# Patient Record
Sex: FEMALE | Race: WHITE | Employment: OTHER | URBAN - METROPOLITAN AREA
[De-identification: names, ages, dates, MRNs, and addresses within clinical notes are randomized per-mention and may not be internally consistent; named-entity substitution may affect disease eponyms.]

---

## 2017-04-26 ENCOUNTER — HOSPITAL ENCOUNTER (OUTPATIENT)
Dept: GENERAL RADIOLOGY | Facility: HOSPITAL | Age: 77
Discharge: HOME | End: 2017-04-26
Attending: INTERNAL MEDICINE
Payer: MEDICARE

## 2017-04-26 DIAGNOSIS — M25.572 ACUTE LEFT ANKLE PAIN: ICD-10-CM

## 2017-04-26 PROCEDURE — 73600 X-RAY EXAM OF ANKLE: CPT

## 2017-07-27 ENCOUNTER — HOSPITAL ENCOUNTER (OUTPATIENT)
Dept: MAMMOGRAPHY | Facility: HOSPITAL | Age: 77
Discharge: HOME | End: 2017-07-27
Attending: INTERNAL MEDICINE
Payer: MEDICARE

## 2017-07-27 ENCOUNTER — HOSPITAL ENCOUNTER (OUTPATIENT)
Dept: BONE DENSITY | Facility: HOSPITAL | Age: 77
Discharge: HOME | End: 2017-07-27
Attending: INTERNAL MEDICINE
Payer: MEDICARE

## 2017-07-27 DIAGNOSIS — Z13.820 ENCOUNTER FOR SCREENING FOR OSTEOPOROSIS: ICD-10-CM

## 2017-07-27 DIAGNOSIS — Z12.39 BREAST CANCER SCREENING: ICD-10-CM

## 2017-07-27 PROCEDURE — 77063 BREAST TOMOSYNTHESIS BI: CPT | Performed by: INTERNAL MEDICINE

## 2017-07-27 PROCEDURE — 77067 SCR MAMMO BI INCL CAD: CPT | Performed by: INTERNAL MEDICINE

## 2017-07-27 PROCEDURE — 77080 DXA BONE DENSITY AXIAL: CPT | Performed by: INTERNAL MEDICINE

## 2017-09-25 PROCEDURE — 87086 URINE CULTURE/COLONY COUNT: CPT | Performed by: NURSE PRACTITIONER

## 2017-10-02 ENCOUNTER — OFFICE VISIT (OUTPATIENT)
Dept: AUDIOLOGY | Facility: CLINIC | Age: 77
End: 2017-10-02

## 2017-10-02 DIAGNOSIS — H90.6 MIXED HEARING LOSS, BILATERAL: Primary | ICD-10-CM

## 2017-10-02 PROCEDURE — 92593 HEARING AID CHECK, BOTH EARS: CPT | Performed by: AUDIOLOGIST

## 2017-10-10 PROBLEM — M81.0 OSTEOPOROSIS, UNSPECIFIED OSTEOPOROSIS TYPE, UNSPECIFIED PATHOLOGICAL FRACTURE PRESENCE: Status: ACTIVE | Noted: 2017-10-10

## 2017-10-30 ENCOUNTER — OFFICE VISIT (OUTPATIENT)
Dept: AUDIOLOGY | Facility: CLINIC | Age: 77
End: 2017-10-30

## 2017-10-30 DIAGNOSIS — H90.6 MIXED HEARING LOSS, BILATERAL: Primary | ICD-10-CM

## 2017-10-30 PROCEDURE — 92592 HEARING AID CHECK, ONE EAR: CPT | Performed by: AUDIOLOGIST

## 2017-10-31 NOTE — PROGRESS NOTES
HEARING AID FOLLOW-UP    Sebastian Andersonleila  11/12/1940  [id number]      Otoscopic Inspection:  both ears: no cerumen and TM visualized      Hearing Aid Information       Right    Left   Make: Oticon Make: Oticon   Model: TEGO PRO ITE Model: TEGO PRO ITE

## 2017-11-16 ENCOUNTER — TELEPHONE (OUTPATIENT)
Dept: DERMATOLOGY CLINIC | Facility: CLINIC | Age: 77
End: 2017-11-16

## 2017-11-16 ENCOUNTER — OFFICE VISIT (OUTPATIENT)
Dept: DERMATOLOGY CLINIC | Facility: CLINIC | Age: 77
End: 2017-11-16

## 2017-11-16 DIAGNOSIS — L82.1 SEBORRHEIC KERATOSES: ICD-10-CM

## 2017-11-16 DIAGNOSIS — D23.30 BENIGN NEOPLASM OF SKIN OF FACE: ICD-10-CM

## 2017-11-16 DIAGNOSIS — L71.9 ROSACEA: Primary | ICD-10-CM

## 2017-11-16 DIAGNOSIS — D23.4 BENIGN NEOPLASM OF SCALP AND SKIN OF NECK: ICD-10-CM

## 2017-11-16 PROCEDURE — G0463 HOSPITAL OUTPT CLINIC VISIT: HCPCS | Performed by: DERMATOLOGY

## 2017-11-16 PROCEDURE — 99213 OFFICE O/P EST LOW 20 MIN: CPT | Performed by: DERMATOLOGY

## 2017-11-16 RX ORDER — METRONIDAZOLE 10 MG/G
GEL TOPICAL
Qty: 60 G | Refills: 3 | Status: SHIPPED | OUTPATIENT
Start: 2017-11-16 | End: 2017-11-16

## 2017-11-16 RX ORDER — METRONIDAZOLE 10 MG/G
GEL TOPICAL
Qty: 60 G | Refills: 3 | Status: SHIPPED
Start: 2017-11-16 | End: 2019-04-01 | Stop reason: ALTCHOICE

## 2017-11-16 NOTE — TELEPHONE ENCOUNTER
Rx sent to [facility]. Per [address] Almeida, ok to change to lotion if metrogel is still too expensive.

## 2017-11-17 ENCOUNTER — TELEPHONE (OUTPATIENT)
Dept: DERMATOLOGY CLINIC | Facility: CLINIC | Age: 77
End: 2017-11-17

## 2017-11-17 NOTE — TELEPHONE ENCOUNTER
Per pt states the metrogel gel  [facility] $187 and [facility] states $160. Pt was told to let dr know how much.

## 2017-11-18 NOTE — TELEPHONE ENCOUNTER
Can we please look into this next week? Pt wanted the 1% as she did not feel the 0.75%  Was working in the past.  I do not know if other programs like \"good rx etc might be less. Is that the cash price at [employer]?   Thanks K

## 2017-11-20 NOTE — TELEPHONE ENCOUNTER
I looked enedina this with Janice - called [facility] too. [facility] price is with INS. This pt is on medicare and none of the coupons/rebates will work. I do not think we can go any lower than this.

## 2017-11-27 NOTE — PROGRESS NOTES
Aubrie Sloan is a 76year old female. Patient presents with:  Lesion: LOV 5/2016. Pt presents with nonhealing lesion on nose x3 months. Bleeds at times. Hx of rosacea. No treatments at this time.              Aspirin    Current Outpatient Presc unknown [OTHER] Father 76   • ileus [OTHER] Mother 80   • Diabetes Brother                       HPI :      Patient presents with:  Lesion: LOV 5/2016. Pt presents with nonhealing lesion on nose x3 months. Bleeds at times. Hx of rosacea.  No treatments at t metronidazole 1% for her rosacea rosacea. Pricing, outside pharmacy options discussed. Consider oral patient did not note much difference with that had lots of side effects does not want to consider that further. Meds in grid.   Skin care instructions re asked to return as noted in follow-up /as noted above

## 2018-01-09 PROBLEM — L71.9 ACNE ROSACEA: Status: ACTIVE | Noted: 2018-01-09

## 2018-01-18 ENCOUNTER — LAB ENCOUNTER (OUTPATIENT)
Dept: LAB | Facility: HOSPITAL | Age: 78
End: 2018-01-18
Attending: INTERNAL MEDICINE
Payer: MEDICARE

## 2018-01-18 DIAGNOSIS — Z13.1 SCREENING FOR DIABETES MELLITUS: ICD-10-CM

## 2018-01-18 DIAGNOSIS — Z13.220 ENCOUNTER FOR SCREENING FOR LIPID DISORDER: ICD-10-CM

## 2018-01-18 LAB
CHOLEST SERPL-MCNC: 231 MG/DL (ref 110–200)
GLUCOSE SERPL-MCNC: 99 MG/DL (ref 70–99)
HDLC SERPL-MCNC: 90 MG/DL
LDLC SERPL CALC-MCNC: 129 MG/DL (ref 0–99)
NONHDLC SERPL-MCNC: 141 MG/DL
TRIGL SERPL-MCNC: 59 MG/DL (ref 1–149)

## 2018-01-18 PROCEDURE — 36415 COLL VENOUS BLD VENIPUNCTURE: CPT

## 2018-01-18 PROCEDURE — 80061 LIPID PANEL: CPT

## 2018-01-18 PROCEDURE — 82947 ASSAY GLUCOSE BLOOD QUANT: CPT

## 2018-01-19 NOTE — PROGRESS NOTES
Informed the pt by voicemail with detail regarding these test results. May call the office with questions.

## 2018-02-05 PROCEDURE — 87086 URINE CULTURE/COLONY COUNT: CPT | Performed by: INTERNAL MEDICINE

## 2018-04-26 PROCEDURE — 87086 URINE CULTURE/COLONY COUNT: CPT | Performed by: NURSE PRACTITIONER

## 2018-06-11 ENCOUNTER — OFFICE VISIT (OUTPATIENT)
Dept: AUDIOLOGY | Facility: CLINIC | Age: 78
End: 2018-06-11

## 2018-06-11 DIAGNOSIS — H90.6 MIXED HEARING LOSS, BILATERAL: Primary | ICD-10-CM

## 2018-06-11 PROCEDURE — 92593 HEARING AID CHECK, BOTH EARS: CPT | Performed by: AUDIOLOGIST

## 2018-06-11 NOTE — PROGRESS NOTES
HEARING AID FOLLOW-UP    Cayetano Shields Julienne  11/12/1940  [id number]        Otoscopic Inspection:  both ears: no cerumen and TM visualized      Hearing Aid Information       Right    Left   Make: Oticon Make: Oticon   Model: TEGO PRO ITE Model: TEGO PRO I right aid. Patient is aware. Two packages of wax guards given as courtesy today.       Patient is to follow up in 6 months     6/11/2018  Jacob Howe

## 2018-07-07 ENCOUNTER — HOSPITAL ENCOUNTER (OUTPATIENT)
Age: 78
Discharge: HOME | End: 2018-07-07
Attending: FAMILY MEDICINE
Payer: MEDICARE

## 2018-07-07 VITALS
HEART RATE: 102 BPM | BODY MASS INDEX: 29 KG/M2 | RESPIRATION RATE: 20 BRPM | DIASTOLIC BLOOD PRESSURE: 59 MMHG | SYSTOLIC BLOOD PRESSURE: 151 MMHG | WEIGHT: 160 LBS | OXYGEN SATURATION: 98 % | TEMPERATURE: 98 F

## 2018-07-07 DIAGNOSIS — J06.9 VIRAL UPPER RESPIRATORY TRACT INFECTION: Primary | ICD-10-CM

## 2018-07-07 LAB — S PYO AG THROAT QL: NEGATIVE

## 2018-07-07 PROCEDURE — 99202 OFFICE O/P NEW SF 15 MIN: CPT

## 2018-07-07 PROCEDURE — 99212 OFFICE O/P EST SF 10 MIN: CPT

## 2018-07-07 PROCEDURE — 87430 STREP A AG IA: CPT

## 2018-08-05 ENCOUNTER — HOSPITAL ENCOUNTER (OUTPATIENT)
Age: 78
Discharge: HOME | End: 2018-08-05
Attending: EMERGENCY MEDICINE
Payer: MEDICARE

## 2018-08-05 VITALS
HEART RATE: 108 BPM | TEMPERATURE: 98 F | OXYGEN SATURATION: 98 % | SYSTOLIC BLOOD PRESSURE: 146 MMHG | WEIGHT: 158 LBS | RESPIRATION RATE: 20 BRPM | BODY MASS INDEX: 29 KG/M2 | DIASTOLIC BLOOD PRESSURE: 50 MMHG

## 2018-08-05 DIAGNOSIS — IMO0001 HYMENOPTERA STING, ACCIDENTAL OR UNINTENTIONAL, INITIAL ENCOUNTER: Primary | ICD-10-CM

## 2018-08-05 PROCEDURE — 99213 OFFICE O/P EST LOW 20 MIN: CPT

## 2018-08-05 PROCEDURE — 99214 OFFICE O/P EST MOD 30 MIN: CPT

## 2018-08-05 RX ORDER — PREDNISONE 20 MG/1
20 TABLET ORAL 2 TIMES DAILY
Qty: 9 TABLET | Refills: 0 | Status: SHIPPED | OUTPATIENT
Start: 2018-08-05 | End: 2018-08-10

## 2018-08-05 RX ORDER — FAMOTIDINE 20 MG/1
20 TABLET ORAL ONCE
Status: COMPLETED | OUTPATIENT
Start: 2018-08-05 | End: 2018-08-05

## 2018-08-05 RX ORDER — PREDNISONE 20 MG/1
20 TABLET ORAL ONCE
Status: COMPLETED | OUTPATIENT
Start: 2018-08-05 | End: 2018-08-05

## 2018-09-19 ENCOUNTER — OFFICE VISIT (OUTPATIENT)
Dept: DERMATOLOGY CLINIC | Facility: CLINIC | Age: 78
End: 2018-09-19
Payer: MEDICARE

## 2018-09-19 DIAGNOSIS — D23.9 BENIGN NEOPLASM OF SKIN, UNSPECIFIED LOCATION: ICD-10-CM

## 2018-09-19 DIAGNOSIS — L71.9 ROSACEA: Primary | ICD-10-CM

## 2018-09-19 PROCEDURE — G0463 HOSPITAL OUTPT CLINIC VISIT: HCPCS | Performed by: DERMATOLOGY

## 2018-09-19 PROCEDURE — 99213 OFFICE O/P EST LOW 20 MIN: CPT | Performed by: DERMATOLOGY

## 2018-09-19 RX ORDER — DOXYCYCLINE HYCLATE 50 MG/1
50 CAPSULE ORAL 2 TIMES DAILY
Qty: 60 CAPSULE | Refills: 12 | Status: SHIPPED | OUTPATIENT
Start: 2018-09-19 | End: 2019-09-19

## 2018-09-19 NOTE — PROGRESS NOTES
Past Medical History:  No date:  Allergic rhinitis  No date: Obesity  No date: Osteoarthritis  No date: Recurrent UTI  Past Surgical History:  No date: CATARACT  No date: CATARACT EXTRACTION W/  INTRAOCULAR LENS IMPLANT  No date: INNER EAR SURGERY [facility] file    Family History   Problem Relation Age of Onset   • Other (unknown) Father 76   • Other (ileus) Mother 80   • Diabetes Brother          Wasp Venom              RASH, SWELLING  Aspirin                 PAIN

## 2018-09-19 NOTE — PATIENT INSTRUCTIONS
Start 2 times a day with the doxycycline, once better then 1 a day. It should be better in 6 weeks.    If eyes are worse then call me

## 2018-10-01 PROBLEM — M48.061 SPINAL STENOSIS OF LUMBAR REGION AT MULTIPLE LEVELS: Status: ACTIVE | Noted: 2018-10-01
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
## 2018-10-01 PROBLEM — S60.561A INSECT BITE OF RIGHT HAND: Status: ACTIVE | Noted: 2018-10-01

## 2018-10-01 NOTE — PROGRESS NOTES
Jolly Dill is a 76year old female. Patient presents with:  Rosacea: LOV: 11/16/17. Pt presents with f/u to rosacea, pt states condition is still present, states it is itchy and has a burning sensation.  Pt used Metronidazole without improvem History:   Diagnosis Date   • Allergic rhinitis    • Obesity    • Osteoarthritis    • Recurrent UTI      Past Surgical History:  No date: CATARACT  No date: CATARACT EXTRACTION W/  INTRAOCULAR LENS IMPLANT  No date: INNER EAR SURGERY PROC UNLISTED  No date History   Problem Relation Age of Onset   • Other (unknown) Father 76   • Other (ileus) Mother 80   • Diabetes Brother                       HPI :      Patient presents with:  Rosacea: LOV: 11/16/17.  Pt presents with f/u to rosacea, pt states condition is nasal dorsum more consistent with inflammatory rosacea lesions. More keratotic area trial of cryo as this may represent benign keratosis versus AK. If not resolved plan biopsy over the next 2 months.      Rosacea we will resume the metronidazole 1% switch encounter. The patient indicates understanding of these issues and agrees to the plan.   The patient is asked to return as noted in follow-up /as noted above

## 2019-03-05 ENCOUNTER — OFFICE VISIT (OUTPATIENT)
Dept: OTOLARYNGOLOGY | Facility: CLINIC | Age: 79
End: 2019-03-05
Payer: MEDICARE

## 2019-03-05 VITALS
DIASTOLIC BLOOD PRESSURE: 62 MMHG | BODY MASS INDEX: 29.08 KG/M2 | WEIGHT: 158 LBS | SYSTOLIC BLOOD PRESSURE: 120 MMHG | HEIGHT: 62 IN

## 2019-03-05 DIAGNOSIS — H92.01 RIGHT EAR PAIN: ICD-10-CM

## 2019-03-05 DIAGNOSIS — H61.23 BILATERAL IMPACTED CERUMEN: Primary | ICD-10-CM

## 2019-03-05 PROCEDURE — 99213 OFFICE O/P EST LOW 20 MIN: CPT | Performed by: OTOLARYNGOLOGY

## 2019-03-05 PROCEDURE — G0463 HOSPITAL OUTPT CLINIC VISIT: HCPCS | Performed by: OTOLARYNGOLOGY

## 2019-03-05 PROCEDURE — 92504 EAR MICROSCOPY EXAMINATION: CPT | Performed by: OTOLARYNGOLOGY

## 2019-03-05 NOTE — PROGRESS NOTES
Aaron Becerra is a [de-identified]year old female. Patient presents with:  Ear Pain: right side seems due to hearing aid, she thinks she may have a piece logded in her ear . also itchiness x 4days also requests a cleaning     HPI:   She has been experience and p - Normal. Skull - Normal.   Oral/Oropharynx Normal Lips - Normal, Tonsils - Normal, Tongue - Normal    Nasal Normal External nose - Normal. Nasal septum - Normal, Turbinates - Normal   Neurological Normal Memory - Normal. Cranial nerves - Cranial nerves II
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
## 2019-04-01 PROBLEM — Z00.00 ANNUAL PHYSICAL EXAM: Status: ACTIVE | Noted: 2019-04-01

## 2019-04-01 PROBLEM — E78.00 ELEVATED CHOLESTEROL: Status: ACTIVE | Noted: 2019-04-01

## 2019-04-01 PROBLEM — S60.561A INSECT BITE OF RIGHT HAND: Status: RESOLVED | Noted: 2018-10-01 | Resolved: 2019-04-01

## 2019-04-22 ENCOUNTER — APPOINTMENT (OUTPATIENT)
Dept: LAB | Facility: HOSPITAL | Age: 79
End: 2019-04-22
Attending: INTERNAL MEDICINE
Payer: MEDICARE

## 2019-04-22 DIAGNOSIS — Z13.228 ENCOUNTER FOR SCREENING FOR METABOLIC DISORDER: ICD-10-CM

## 2019-04-22 DIAGNOSIS — Z13.29 ENCOUNTER FOR SCREENING FOR ENDOCRINE DISORDER: ICD-10-CM

## 2019-04-22 DIAGNOSIS — E78.00 ELEVATED CHOLESTEROL: ICD-10-CM

## 2019-04-22 PROCEDURE — 80053 COMPREHEN METABOLIC PANEL: CPT

## 2019-04-22 PROCEDURE — 83036 HEMOGLOBIN GLYCOSYLATED A1C: CPT

## 2019-04-22 PROCEDURE — 80061 LIPID PANEL: CPT

## 2019-04-22 PROCEDURE — 84443 ASSAY THYROID STIM HORMONE: CPT

## 2019-04-22 PROCEDURE — 36415 COLL VENOUS BLD VENIPUNCTURE: CPT

## 2019-04-23 ENCOUNTER — HOSPITAL ENCOUNTER (OUTPATIENT)
Dept: BONE DENSITY | Facility: HOSPITAL | Age: 79
Discharge: HOME | End: 2019-04-23
Attending: INTERNAL MEDICINE
Payer: MEDICARE

## 2019-04-23 ENCOUNTER — HOSPITAL ENCOUNTER (OUTPATIENT)
Dept: MAMMOGRAPHY | Facility: HOSPITAL | Age: 79
Discharge: HOME | End: 2019-04-23
Attending: INTERNAL MEDICINE
Payer: MEDICARE

## 2019-04-23 DIAGNOSIS — Z12.39 BREAST CANCER SCREENING: ICD-10-CM

## 2019-04-23 DIAGNOSIS — Z13.820 OSTEOPOROSIS SCREENING: ICD-10-CM

## 2019-04-23 PROCEDURE — 77080 DXA BONE DENSITY AXIAL: CPT | Performed by: INTERNAL MEDICINE

## 2019-04-23 PROCEDURE — 77067 SCR MAMMO BI INCL CAD: CPT | Performed by: INTERNAL MEDICINE

## 2019-04-23 PROCEDURE — 77063 BREAST TOMOSYNTHESIS BI: CPT | Performed by: INTERNAL MEDICINE

## 2019-07-16 ENCOUNTER — OFFICE VISIT (OUTPATIENT)
Dept: AUDIOLOGY | Facility: CLINIC | Age: 79
End: 2019-07-16
Payer: MEDICARE

## 2019-07-16 DIAGNOSIS — H90.6 MIXED HEARING LOSS, BILATERAL: Primary | ICD-10-CM

## 2019-07-16 PROCEDURE — 92593 HEARING AID CHECK, BOTH EARS: CPT | Performed by: AUDIOLOGIST

## 2019-07-16 NOTE — PROGRESS NOTES
HEARING AID FOLLOW-UP    Caleb Comfort Robins  11/12/1940  [id number]      Otoscopic Inspection:  both ears: no cerumen and TM visualized      Hearing Aid Information       Right    Left   Make: Oticon Make: Oticon   Model: TEGO PRO ITE Model: TEGO PRO ITE

## 2019-08-26 ENCOUNTER — TELEPHONE (OUTPATIENT)
Dept: OTOLARYNGOLOGY | Facility: CLINIC | Age: 79
End: 2019-08-26

## 2019-08-26 ENCOUNTER — OFFICE VISIT (OUTPATIENT)
Dept: AUDIOLOGY | Facility: CLINIC | Age: 79
End: 2019-08-26
Payer: MEDICARE

## 2019-08-26 DIAGNOSIS — H90.6 MIXED HEARING LOSS, BILATERAL: Primary | ICD-10-CM

## 2019-08-26 DIAGNOSIS — H91.90 HEARING LOSS, UNSPECIFIED HEARING LOSS TYPE, UNSPECIFIED LATERALITY: Primary | ICD-10-CM

## 2019-08-26 PROCEDURE — 92557 COMPREHENSIVE HEARING TEST: CPT | Performed by: AUDIOLOGIST

## 2019-08-26 PROCEDURE — 92591 HEARING AID EXAM, BOTH EARS: CPT | Performed by: AUDIOLOGIST

## 2019-08-26 PROCEDURE — V5014 HEARING AID REPAIR/MODIFYING: HCPCS | Performed by: AUDIOLOGIST

## 2019-08-26 NOTE — PROGRESS NOTES
AUDIOGRAM     Gloria Robins was referred for testing by Meli Colbert V for decreased hearing in both ears. 11/12/1940  [id number]        Otoscopic inspection: right ear no cerumen; left ear no cerumen.        Tests/Procedures  Patient was tested via Gloria:    Explanation of Audiogram  Patient's hearing loss  Importance of binaural instruments  Benefits/limitations of style    Hearing Aid Features  Automatic selection  Dual microphones  Noise suppression  Compatible with FM devices  Compatible with

## 2019-09-04 ENCOUNTER — OFFICE VISIT (OUTPATIENT)
Dept: AUDIOLOGY | Facility: CLINIC | Age: 79
End: 2019-09-04

## 2019-09-04 DIAGNOSIS — H90.6 MIXED CONDUCTIVE AND SENSORINEURAL HEARING LOSS OF BOTH EARS: Primary | ICD-10-CM

## 2019-09-04 PROCEDURE — V5260 HEARING AID, DIGIT, BIN, ITE: HCPCS | Performed by: AUDIOLOGIST

## 2019-09-04 NOTE — PROGRESS NOTES
Hearing Aid Fitting    [address] purchased two hearing aids   Patient is a previous used and handled all very well    The hearing aid fitting was completed by Brooke Medina       Hearing Aid Information       Right    Left   Make: [place]

## 2019-09-18 ENCOUNTER — OFFICE VISIT (OUTPATIENT)
Dept: AUDIOLOGY | Facility: CLINIC | Age: 79
End: 2019-09-18
Payer: MEDICARE

## 2019-09-18 DIAGNOSIS — H90.6 MIXED HEARING LOSS, BILATERAL: Primary | ICD-10-CM

## 2019-09-18 PROCEDURE — 92593 HEARING AID CHECK, BOTH EARS: CPT | Performed by: AUDIOLOGIST

## 2019-09-18 NOTE — PROGRESS NOTES
HEARING AID FOLLOW-UP    Cayetano Shields Julienne  11/12/1940  [id number]    Otoscopic Inspection:  both ears: no cerumen and TM visualized      Hearing Aid Information       Right    Left   Make: Oticon Make: Oticon   Model: OPN 2 FS Model: OPN 2 FS   Serial

## 2019-09-23 ENCOUNTER — AUDIOLOGY DOCUMENTATION (OUTPATIENT)
Dept: AUDIOLOGY | Facility: CLINIC | Age: 79
End: 2019-09-23

## 2019-09-23 NOTE — PROGRESS NOTES
Patient dropped off both aids  Called patient and advised I rec'd and will send away. We will contact when aids come back from repair.       Hearing Aid Information       Right    Left   Make: Oticon Make: Oticon   Model: OPN 2 FS Model: OPN 2 FS   Serial

## 2019-10-10 ENCOUNTER — TELEPHONE (OUTPATIENT)
Dept: AUDIOLOGY | Facility: CLINIC | Age: 79
End: 2019-10-10

## 2019-10-10 NOTE — TELEPHONE ENCOUNTER
Spoke to patient. Advised remade hearing aids in. She chose to  at . Will have follow-up appointment on 10/17/2019  Reprogrammed to patient's settings and added push button VC option. Patient aware. No charges entered.

## 2019-10-17 ENCOUNTER — OFFICE VISIT (OUTPATIENT)
Dept: AUDIOLOGY | Facility: CLINIC | Age: 79
End: 2019-10-17
Payer: MEDICARE

## 2019-10-17 DIAGNOSIS — H90.6 MIXED HEARING LOSS, BILATERAL: Primary | ICD-10-CM

## 2019-10-17 PROCEDURE — 92593 HEARING AID CHECK, BOTH EARS: CPT | Performed by: AUDIOLOGIST

## 2019-10-17 NOTE — PROGRESS NOTES
HEARING AID FOLLOW-UP    David Andersonleila  11/12/1940  [id number]        Hearing Aid Information       Right    Left   Make: Oticon Make: Oticon   Model: OPN 2 FS Model: OPN 2 FS   Serial Number: [id number] Serial Number: [id number]   Date of Purchase: 09/

## 2020-05-07 ENCOUNTER — AUDIOLOGY DOCUMENTATION (OUTPATIENT)
Dept: AUDIOLOGY | Facility: CLINIC | Age: 80
End: 2020-05-07

## 2020-05-07 NOTE — PROGRESS NOTES
Patient called with hearing aid problem. Left aid not working at all. Told her to check battery and wax guard. She changed wax guard and found aid was working again. Very grateful that she did not have to come in.

## 2020-08-01 ENCOUNTER — HOSPITAL ENCOUNTER (OUTPATIENT)
Age: 80
Discharge: HOME | End: 2020-08-01
Attending: EMERGENCY MEDICINE
Payer: MEDICARE

## 2020-08-01 VITALS
SYSTOLIC BLOOD PRESSURE: 159 MMHG | RESPIRATION RATE: 18 BRPM | DIASTOLIC BLOOD PRESSURE: 64 MMHG | HEART RATE: 84 BPM | OXYGEN SATURATION: 98 % | TEMPERATURE: 97 F

## 2020-08-01 DIAGNOSIS — N39.0 URINARY TRACT INFECTION WITHOUT HEMATURIA, SITE UNSPECIFIED: Primary | ICD-10-CM

## 2020-08-01 LAB
GLUCOSE UR STRIP-MCNC: 100 MG/DL
KETONES UR STRIP-MCNC: NEGATIVE MG/DL
NITRITE UR QL STRIP: POSITIVE
PH UR STRIP: 5.5 [PH]
PROT UR STRIP-MCNC: 30 MG/DL
SP GR UR STRIP: 1.01
UROBILINOGEN UR STRIP-ACNC: <2 MG/DL

## 2020-08-01 PROCEDURE — 99214 OFFICE O/P EST MOD 30 MIN: CPT

## 2020-08-01 PROCEDURE — 81002 URINALYSIS NONAUTO W/O SCOPE: CPT

## 2020-08-01 PROCEDURE — 87086 URINE CULTURE/COLONY COUNT: CPT | Performed by: EMERGENCY MEDICINE

## 2020-08-01 PROCEDURE — 87186 SC STD MICRODIL/AGAR DIL: CPT | Performed by: EMERGENCY MEDICINE

## 2020-08-01 PROCEDURE — 87077 CULTURE AEROBIC IDENTIFY: CPT | Performed by: EMERGENCY MEDICINE

## 2020-08-01 RX ORDER — PHENAZOPYRIDINE HYDROCHLORIDE 200 MG/1
200 TABLET, FILM COATED ORAL 3 TIMES DAILY PRN
Qty: 6 TABLET | Refills: 0 | Status: SHIPPED | OUTPATIENT
Start: 2020-08-01 | End: 2020-08-03

## 2020-08-01 RX ORDER — NITROFURANTOIN 25; 75 MG/1; MG/1
100 CAPSULE ORAL 2 TIMES DAILY
Qty: 10 CAPSULE | Refills: 0 | Status: SHIPPED | OUTPATIENT
Start: 2020-08-01 | End: 2020-08-06

## 2020-08-01 NOTE — ED INITIAL ASSESSMENT (HPI)
PATIENT ARRIVED AMBULATORY TO ROOM C/O SYMPTOMS OF A UTI THAT STARTED 3 DAYS AGO. +BURNING WITH URINATION. NO FEVERS. NO ABDOMINAL PAIN. NO BACK PAIN.

## 2020-08-01 NOTE — ED PROVIDER NOTES
Patient Seen in: [facility]      History   Patient presents with:  Urinary Symptoms    Stated Complaint: UTI    HPI    77 yo female with dysuria, urgency and frequency. No fever. No vomiting. No back pain.  No abdominal pa rhythm. Pulmonary:      Effort: Pulmonary effort is normal. No respiratory distress. Abdominal:      General: Abdomen is flat. There is no distension. Tenderness: There is no tenderness. There is no right CVA tenderness or left CVA tenderness.    Star Weir

## 2020-10-06 ENCOUNTER — LAB ENCOUNTER (OUTPATIENT)
Dept: LAB | Facility: HOSPITAL | Age: 80
End: 2020-10-06
Attending: INTERNAL MEDICINE
Payer: MEDICARE

## 2020-10-06 DIAGNOSIS — Z13.228 ENCOUNTER FOR SCREENING FOR METABOLIC DISORDER: ICD-10-CM

## 2020-10-06 DIAGNOSIS — Z13.29 ENCOUNTER FOR SCREENING FOR ENDOCRINE DISORDER: ICD-10-CM

## 2020-10-06 DIAGNOSIS — Z13.0 SCREENING FOR DISORDER OF BLOOD AND BLOOD-FORMING ORGANS: ICD-10-CM

## 2020-10-06 PROCEDURE — 84443 ASSAY THYROID STIM HORMONE: CPT

## 2020-10-06 PROCEDURE — 85025 COMPLETE CBC W/AUTO DIFF WBC: CPT

## 2020-10-06 PROCEDURE — 80053 COMPREHEN METABOLIC PANEL: CPT

## 2020-10-06 PROCEDURE — 84439 ASSAY OF FREE THYROXINE: CPT

## 2020-10-06 PROCEDURE — 36415 COLL VENOUS BLD VENIPUNCTURE: CPT

## 2021-02-20 NOTE — PROGRESS NOTES
HEARING AID FOLLOW-UP    Laly Abernathy Julienne  11/12/1940  [id number]      Otoscopic Inspection:  both ears: no cerumen and TM visualized      Hearing Aid Information       Right    Left   Make: Oticon Make: Oticon   Model: TEGO PRO ITE Model: TEGO PRO ITE 20-Feb-2021

## 2021-03-09 DIAGNOSIS — Z23 NEED FOR VACCINATION: ICD-10-CM

## 2021-03-21 ENCOUNTER — IMMUNIZATION (OUTPATIENT)
Dept: LAB | Facility: HOSPITAL | Age: 81
End: 2021-03-21
Attending: HOSPITALIST
Payer: MEDICARE

## 2021-03-21 DIAGNOSIS — Z23 NEED FOR VACCINATION: Primary | ICD-10-CM

## 2021-03-21 PROCEDURE — 0011A SARSCOV2 VAC 100MCG/0.5ML IM: CPT

## 2021-04-18 ENCOUNTER — IMMUNIZATION (OUTPATIENT)
Dept: LAB | Facility: HOSPITAL | Age: 81
End: 2021-04-18
Attending: EMERGENCY MEDICINE
Payer: MEDICARE

## 2021-04-18 DIAGNOSIS — Z23 NEED FOR VACCINATION: Primary | ICD-10-CM

## 2021-04-18 PROCEDURE — 0012A SARSCOV2 VAC 100MCG/0.5ML IM: CPT

## 2021-06-21 ENCOUNTER — OFFICE VISIT (OUTPATIENT)
Dept: AUDIOLOGY | Facility: CLINIC | Age: 81
End: 2021-06-21

## 2021-06-21 DIAGNOSIS — H90.6 MIXED HEARING LOSS, BILATERAL: Primary | ICD-10-CM

## 2021-06-21 PROCEDURE — V5266 BATTERY FOR HEARING DEVICE: HCPCS | Performed by: AUDIOLOGIST

## 2021-06-21 PROCEDURE — 92593 HEARING AID CHECK, BOTH EARS: CPT | Performed by: AUDIOLOGIST

## 2021-06-21 NOTE — PROGRESS NOTES
HEARING AID FOLLOW-UP    Javon Andrade [facility]  11/12/1940  [id number]    Otoscopic Inspection:  both ears: no cerumen and TM partially visualized      Hearing Aid Information       Right    Left   Make: Oticon Make: Oticon   Model: OPN 2 FS Model: OPN 2 FS large missing portion. Covered with comply wraps for comfort, but recommended patient use moleskin to cover this area. Call patient for front  if no programming changes are needed.   If possible, make a follow-up appointment for real ear testing

## 2021-07-01 ENCOUNTER — AUDIOLOGY DOCUMENTATION (OUTPATIENT)
Dept: AUDIOLOGY | Facility: CLINIC | Age: 81
End: 2021-07-01

## 2021-07-01 NOTE — PROGRESS NOTES
Hearing Aid Information        Right    Left   Make: Oticon Make: Oticon   Model: OPN 2 FS Model: OPN 2 FS   Serial Number: [id number] Serial Number: [id number]   Date of Purchase: 09/04/19 Date of Purchase: 09/04/19   Repair Warranty Exp: 09/04/22 Repair W

## 2022-08-29 ENCOUNTER — APPOINTMENT (OUTPATIENT)
Dept: ULTRASOUND IMAGING | Age: 82
End: 2022-08-29
Attending: EMERGENCY MEDICINE
Payer: MEDICARE

## 2022-08-29 ENCOUNTER — HOSPITAL ENCOUNTER (OUTPATIENT)
Age: 82
Discharge: HOME | End: 2022-08-29
Attending: EMERGENCY MEDICINE
Payer: MEDICARE

## 2022-08-29 ENCOUNTER — APPOINTMENT (OUTPATIENT)
Dept: GENERAL RADIOLOGY | Age: 82
End: 2022-08-29
Attending: EMERGENCY MEDICINE
Payer: MEDICARE

## 2022-08-29 VITALS
SYSTOLIC BLOOD PRESSURE: 164 MMHG | RESPIRATION RATE: 20 BRPM | DIASTOLIC BLOOD PRESSURE: 79 MMHG | OXYGEN SATURATION: 98 % | HEART RATE: 99 BPM | TEMPERATURE: 98 F

## 2022-08-29 DIAGNOSIS — M17.11 OSTEOARTHRITIS OF RIGHT KNEE, UNSPECIFIED OSTEOARTHRITIS TYPE: Primary | ICD-10-CM

## 2022-08-29 DIAGNOSIS — M71.21 BAKER'S CYST, RIGHT: ICD-10-CM

## 2022-08-29 PROCEDURE — 99214 OFFICE O/P EST MOD 30 MIN: CPT

## 2022-08-29 PROCEDURE — 93971 EXTREMITY STUDY: CPT | Performed by: EMERGENCY MEDICINE

## 2022-08-29 PROCEDURE — 73560 X-RAY EXAM OF KNEE 1 OR 2: CPT | Performed by: EMERGENCY MEDICINE

## 2022-08-29 PROCEDURE — 99213 OFFICE O/P EST LOW 20 MIN: CPT

## 2022-08-29 NOTE — ED INITIAL ASSESSMENT (HPI)
PATIENT ARRIVED AMBULATORY TO ROOM C/O RIGHT KNEE PAIN THAT STARTED S/P A FALL THAT OCCURRED 2 WEEKS AGO. PAIN WORSENS WITH MOVEMENT.

## 2022-10-12 ENCOUNTER — AUDIOLOGY DOCUMENTATION (OUTPATIENT)
Dept: AUDIOLOGY | Facility: CLINIC | Age: 82
End: 2022-10-12

## 2022-10-12 NOTE — PROGRESS NOTES
Patient walked in to  and wished to purchase extended warranty    [employer] was contacted and ads were extended      Hearing Aid Information       Right    Left   Make: Oticon Make: Oticon   Model: OPN 2 FS Model: OPN 2 FS   Serial Number: [id number] Serial Number: [id number]   Date of Purchase: 09/04/19 Date of Purchase: 09/04/19   Repair Warranty Exp: 09/04/23 Repair Warranty Exp: 09/04/23   L&D Warranty Exp: 09/04/23 L&D Warranty Exp: 09/04/23   Color: beige Color: beige   Battery: 13 Battery: 13     Charges entered today: $400.00    Patient will be billed

## 2023-01-12 ENCOUNTER — HOSPITAL ENCOUNTER (OUTPATIENT)
Age: 83
Discharge: HOME | End: 2023-01-12
Attending: EMERGENCY MEDICINE
Payer: MEDICARE

## 2023-01-12 VITALS
HEART RATE: 90 BPM | DIASTOLIC BLOOD PRESSURE: 78 MMHG | OXYGEN SATURATION: 98 % | TEMPERATURE: 98 F | SYSTOLIC BLOOD PRESSURE: 154 MMHG | RESPIRATION RATE: 20 BRPM

## 2023-01-12 DIAGNOSIS — N30.01 ACUTE CYSTITIS WITH HEMATURIA: Primary | ICD-10-CM

## 2023-01-12 LAB
BILIRUB UR QL STRIP: NEGATIVE
GLUCOSE UR STRIP-MCNC: NEGATIVE MG/DL
KETONES UR STRIP-MCNC: NEGATIVE MG/DL
NITRITE UR QL STRIP: NEGATIVE
PH UR STRIP: 6 [PH]
PROT UR STRIP-MCNC: 30 MG/DL
SP GR UR STRIP: 1.02
UROBILINOGEN UR STRIP-ACNC: <2 MG/DL

## 2023-01-12 PROCEDURE — 81002 URINALYSIS NONAUTO W/O SCOPE: CPT

## 2023-01-12 PROCEDURE — 87186 SC STD MICRODIL/AGAR DIL: CPT | Performed by: EMERGENCY MEDICINE

## 2023-01-12 PROCEDURE — 87086 URINE CULTURE/COLONY COUNT: CPT | Performed by: EMERGENCY MEDICINE

## 2023-01-12 PROCEDURE — 87077 CULTURE AEROBIC IDENTIFY: CPT | Performed by: EMERGENCY MEDICINE

## 2023-01-12 PROCEDURE — 99214 OFFICE O/P EST MOD 30 MIN: CPT

## 2023-01-12 RX ORDER — CEPHALEXIN 500 MG/1
500 CAPSULE ORAL 3 TIMES DAILY
Qty: 21 CAPSULE | Refills: 0 | Status: SHIPPED | OUTPATIENT
Start: 2023-01-12 | End: 2023-01-19

## 2023-01-12 NOTE — ED INITIAL ASSESSMENT (HPI)
Urinary symptoms since Sunday, no fever, no flank or back pain, + suprapubic pressure Abdomen soft, non-tender, no guarding.

## 2023-02-28 ENCOUNTER — OFFICE VISIT (OUTPATIENT)
Dept: OTOLARYNGOLOGY | Facility: CLINIC | Age: 83
End: 2023-02-28

## 2023-02-28 ENCOUNTER — OFFICE VISIT (OUTPATIENT)
Dept: AUDIOLOGY | Facility: CLINIC | Age: 83
End: 2023-02-28

## 2023-02-28 VITALS — BODY MASS INDEX: 30.21 KG/M2 | WEIGHT: 160 LBS | HEIGHT: 61 IN | TEMPERATURE: 99 F

## 2023-02-28 DIAGNOSIS — H90.3 SENSORINEURAL HEARING LOSS (SNHL) OF BOTH EARS: Primary | ICD-10-CM

## 2023-02-28 DIAGNOSIS — H90.6 MIXED HEARING LOSS, BILATERAL: Primary | ICD-10-CM

## 2023-02-28 PROCEDURE — 92557 COMPREHENSIVE HEARING TEST: CPT | Performed by: AUDIOLOGIST

## 2023-02-28 PROCEDURE — 99213 OFFICE O/P EST LOW 20 MIN: CPT | Performed by: STUDENT IN AN ORGANIZED HEALTH CARE EDUCATION/TRAINING PROGRAM

## 2023-02-28 PROCEDURE — 92504 EAR MICROSCOPY EXAMINATION: CPT | Performed by: STUDENT IN AN ORGANIZED HEALTH CARE EDUCATION/TRAINING PROGRAM

## 2023-02-28 PROCEDURE — 92567 TYMPANOMETRY: CPT | Performed by: AUDIOLOGIST

## 2023-04-10 ENCOUNTER — OFFICE VISIT (OUTPATIENT)
Dept: AUDIOLOGY | Facility: CLINIC | Age: 83
End: 2023-04-10

## 2023-04-10 DIAGNOSIS — H90.6 MIXED HEARING LOSS, BILATERAL: Primary | ICD-10-CM

## 2023-04-10 NOTE — PROGRESS NOTES
HEARING AID FOLLOW-UP    Anastasia Robins  11/12/1940  [id number]      Otoscopic Inspection:  both ears: no cerumen and TM visualized        Hearing Aid Information       Right    Left   Make: Oticon Make: Oticon   Model: OPN 2 FS Model: OPN 2 FS   Serial Number: [id number] Serial Number: [id number]   Date of Purchase: 09/04/19 Date of Purchase: 09/04/19   Repair Warranty Exp: 09/04/23 Repair Warranty Exp: 09/04/23   L&D Warranty Exp: 09/04/23 L&D Warranty Exp: 09/04/23   Color: beige Color: beige   Battery: 13 Battery: 13       Patient is here for a routine. The patient has the following concerns: she is not hearing as well  Last audiogram done 2/28/2023 showed decrease in sensitivity   She is here for reprogramming .       In office the following actions were taken:  Cleaned aids  Suctioned microphone ports  Suctioned  ports  Placed aids in /dehumidifier  Changed microphone guards  Changed wax guards  Clenaed battery doors and contacts    Reprogrammed to reflect changes in audiometric data  Gave patient four programs that are synced to use as volume changes   Verified user settings with real ear testing     Listening check: good       Patient is to follow up in as needed    4/10/2023  Jacob Barr

## 2023-04-18 ENCOUNTER — HOSPITAL ENCOUNTER (OUTPATIENT)
Age: 83
Discharge: HOME | End: 2023-04-18
Payer: MEDICARE

## 2023-04-18 VITALS
HEART RATE: 94 BPM | TEMPERATURE: 98 F | SYSTOLIC BLOOD PRESSURE: 152 MMHG | OXYGEN SATURATION: 98 % | RESPIRATION RATE: 20 BRPM | DIASTOLIC BLOOD PRESSURE: 94 MMHG
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
## 2023-04-18 DIAGNOSIS — S00.96XA TICK BITE OF HEAD, UNSPECIFIED PART, INITIAL ENCOUNTER: Primary | ICD-10-CM

## 2023-04-18 PROCEDURE — 99213 OFFICE O/P EST LOW 20 MIN: CPT

## 2023-04-18 PROCEDURE — 99214 OFFICE O/P EST MOD 30 MIN: CPT

## 2023-04-18 RX ORDER — DOXYCYCLINE HYCLATE 100 MG/1
200 CAPSULE ORAL DAILY
Qty: 2 CAPSULE | Refills: 0 | Status: SHIPPED | OUTPATIENT
Start: 2023-04-18 | End: 2023-04-19

## 2023-04-18 NOTE — ED INITIAL ASSESSMENT (HPI)
Patient with right temple tick bite noted this am while showering. Was able to remove the tick completely.

## 2023-04-18 NOTE — DISCHARGE INSTRUCTIONS
PT REQUESTED I NOT WAKE HER, PT SLEEPING AT THIS TIME. You were seen in immediate care today after removing a tick from your scalp this morning. You have been given an antibiotic to treat you in case you were to develop Lyme disease. If you develop other symptoms including widespread rash or aches and pains please follow-up with your primary care doctor.

## 2023-06-05 ENCOUNTER — AUDIOLOGY DOCUMENTATION (OUTPATIENT)
Dept: AUDIOLOGY | Facility: CLINIC | Age: 83
End: 2023-06-05

## 2023-06-05 NOTE — PROGRESS NOTES
Hearing Aid Information       Right    Left   Make: Oticon Make: Oticon   Model: OPN 2 FS Model: OPN 2 FS   Serial Number: [id number] Serial Number: [id number]   Date of Purchase: 09/04/19 Date of Purchase: 09/04/19   Repair Warranty Exp: 09/04/23 Repair Warranty Exp: 09/04/23   L&D Warranty Exp: 09/04/23 L&D Warranty Exp: 09/04/23   Color: beige Color: beige   Battery: 13 Battery: 13     Patient dropped off left aid. Crack in aid and wishes for removal line to be put back on  Aid is under warranty  Sent to [address] patient we will contact her once aid is back from repair.   Patient can  at  if no programming changes are needed       No charges entered

## 2023-06-19 ENCOUNTER — TELEPHONE (OUTPATIENT)
Dept: AUDIOLOGY | Facility: CLINIC | Age: 83
End: 2023-06-19

## 2023-06-19 NOTE — TELEPHONE ENCOUNTER
Called and spoke to patient  Advised hearing aid back from repair. She can  at  at her convenience.   No charges entered - aid is under warranty

## 2023-08-30 ENCOUNTER — TELEPHONE (OUTPATIENT)
Dept: AUDIOLOGY | Facility: CLINIC | Age: 83
End: 2023-08-30

## 2023-09-05 ENCOUNTER — TELEPHONE (OUTPATIENT)
Dept: AUDIOLOGY | Facility: CLINIC | Age: 83
End: 2023-09-05

## 2023-09-05 NOTE — TELEPHONE ENCOUNTER
Ismael LYNNE  Outgoing Gloria Robins (Self) -- Remove   Spoke to patient and advised that I did extend her hearing aid warranty with the  and a bill will be sent to her in the mail

## 2023-12-06 ENCOUNTER — HOSPITAL ENCOUNTER (OUTPATIENT)
Age: 83
Discharge: HOME | End: 2023-12-06
Payer: MEDICARE

## 2023-12-06 VITALS
HEART RATE: 97 BPM | RESPIRATION RATE: 18 BRPM | DIASTOLIC BLOOD PRESSURE: 75 MMHG | TEMPERATURE: 98 F | OXYGEN SATURATION: 95 % | SYSTOLIC BLOOD PRESSURE: 178 MMHG

## 2023-12-06 DIAGNOSIS — T16.1XXA FOREIGN BODY OF RIGHT EAR, INITIAL ENCOUNTER: Primary | ICD-10-CM

## 2023-12-06 DIAGNOSIS — H90.6 MIXED HEARING LOSS, BILATERAL: ICD-10-CM

## 2023-12-06 DIAGNOSIS — Z97.4 DOES USE HEARING AID: ICD-10-CM

## 2023-12-06 PROCEDURE — 69200 CLEAR OUTER EAR CANAL: CPT

## 2023-12-06 PROCEDURE — 99212 OFFICE O/P EST SF 10 MIN: CPT

## 2023-12-06 PROCEDURE — 99213 OFFICE O/P EST LOW 20 MIN: CPT

## 2023-12-06 NOTE — ED INITIAL ASSESSMENT (HPI)
Patient with right ear discomfort and right sided headache. She thinks she has cotton in her ear. She has habit of placing cotton in her ears for showers. States she took her dog to the vet and had the  look in her ear and was told there is something in there.   She is wearing her hearing aids

## 2023-12-08 ENCOUNTER — AUDIOLOGY DOCUMENTATION (OUTPATIENT)
Dept: AUDIOLOGY | Facility: CLINIC | Age: 83
End: 2023-12-08

## 2023-12-08 ENCOUNTER — OFFICE VISIT (OUTPATIENT)
Dept: OTOLARYNGOLOGY | Facility: CLINIC | Age: 83
End: 2023-12-08

## 2023-12-08 VITALS — WEIGHT: 160 LBS | HEIGHT: 61 IN | TEMPERATURE: 98 F | BODY MASS INDEX: 30.21 KG/M2

## 2023-12-08 DIAGNOSIS — H92.01 RIGHT EAR PAIN: ICD-10-CM

## 2023-12-08 DIAGNOSIS — H91.90 HEARING LOSS, UNSPECIFIED HEARING LOSS TYPE, UNSPECIFIED LATERALITY: Primary | ICD-10-CM

## 2023-12-08 NOTE — PROGRESS NOTES
Hearing Aid Information        Right    Left   Make: Oticon Make: Oticon   Model: OPN 2 FS Model: OPN 2 FS   Serial Number: [id number] Serial Number: [id number]   Date of Purchase: 09/04/19 Date of Purchase: 09/04/19   Repair Warranty Exp: 09/04/23 Repair Warranty Exp: 09/04/23   L&D Warranty Exp: 09/04/23 L&D Warranty Exp: 09/04/23   Color: beige Color: beige   Battery: 13 Battery: 13     Patient dropped off left aid for repair. There is a large hole in the housing/shell. Sent to [facility] under warranty. Call pt for  .     Brooke Vale

## 2023-12-18 ENCOUNTER — TELEPHONE (OUTPATIENT)
Dept: AUDIOLOGY | Facility: CLINIC | Age: 83
End: 2023-12-18

## 2023-12-18 NOTE — TELEPHONE ENCOUNTER
Hearing Aid Information       Right    Left   Make: Oticon Make: Oticon   Model: OPN 2 FS Model: OPN 2 FS   Serial Number: [id number] Serial Number: [id number]   Date of Purchase: 09/04/19 Date of Purchase: 09/04/19   Repair Warranty Exp: 09/04/23 Repair Warranty Exp: 09/04/23   L&D Warranty Exp: 09/04/23 L&D Warranty Exp: 09/04/23   Color: beige Color: beige   Battery: 13 Battery: 13     Patient's left aid is back from repair   Aid was programmed in office to user settings    Patient was called for    No charges entered - aid is under warranty

## 2024-01-29 ENCOUNTER — AUDIOLOGY DOCUMENTATION (OUTPATIENT)
Dept: AUDIOLOGY | Facility: CLINIC | Age: 84
End: 2024-01-29

## 2024-01-29 NOTE — PROGRESS NOTES
Hearing Aid Information       Right    Left   Make: Oticon Make: Oticon   Model: OPN 2 FS Model: OPN 2 FS   Serial Number: [id number] Serial Number: [id number]   Date of Purchase: 09/04/19 Date of Purchase: 09/04/19   Repair Warranty Exp: 09/04/24 Repair Warranty Exp: 09/04/24   L&D Warranty Exp: 09/04/24 L&D Warranty Exp: 09/04/24   Color: beige Color: beige   Battery: 13 Battery: 13         Patient dropped off right hearing aid- piece broken and removal pull missing    Aid is under warranty- sent to [employer]  No charges entered    Call patient for front  once aid is back from repair.

## 2024-02-14 ENCOUNTER — TELEPHONE (OUTPATIENT)
Dept: AUDIOLOGY | Facility: CLINIC | Age: 84
End: 2024-02-14

## 2024-02-27 ENCOUNTER — OFFICE VISIT (OUTPATIENT)
Dept: AUDIOLOGY | Facility: CLINIC | Age: 84
End: 2024-02-27

## 2024-02-27 DIAGNOSIS — H90.3 SENSORINEURAL HEARING LOSS, BILATERAL: Primary | ICD-10-CM

## 2024-02-27 PROCEDURE — 92593 HEARING AID CHECK, BOTH EARS: CPT | Performed by: AUDIOLOGIST

## 2024-02-27 NOTE — PROGRESS NOTES
HEARING AID FOLLOW-UP    Gloria OCAMPO Bachmesarkis  11/12/1940  [id number]    Otoscopic Inspection:  both ears: no cerumen and TM visualized      Hearing Aid Information       Right    Left   Make: Oticon Make: Oticon   Model: OPN 2 FS Model: OPN 2 FS   Serial Number: [id number] Serial Number: [id number]   Date of Purchase: 09/04/19 Date of Purchase: 09/04/19   Repair Warranty Exp: 09/04/24 Repair Warranty Exp: 09/04/24   L&D Warranty Exp: 09/04/24 L&D Warranty Exp: 09/04/24   Color: beige Color: beige   Battery: 13 Battery: 13         Patient is here for follow up after picking up right hearing aid remake    The patient has the following concerns: she has some soreness at superior portion of EAC.  No sores or scabbing- just tenderness    Buffed right aid to help alleviate the problem   Gave otoease to help with insertion    Advised to keep aid out until ear is no longer sore to the touch    In office the following actions were taken:  Cleaned aids  Suctioned microphone ports  Suctioned  ports  Placed aids in /dehumidifier  Changed wax guards  Cleaned battery contacts and doors     Verified user settings with real ear testing- patient does not want hearing aids reprogrammed at all  Declined testing today   Listening check: good    Courtesy visit today- no charges entered     Patient is to follow up in as needed    2/27/2024  Jacob Carballo

## 2024-03-18 ENCOUNTER — TELEPHONE (OUTPATIENT)
Dept: AUDIOLOGY | Facility: CLINIC | Age: 84
End: 2024-03-18

## 2024-03-18 NOTE — TELEPHONE ENCOUNTER
Spoke to patient- advised to keep hearing aid out of her ear. Made appointment to see me tomorrow at 11:00

## 2024-03-18 NOTE — TELEPHONE ENCOUNTER
Per pt needs an appointment with Kirsten Hagan asap stating her ear is swelling and something needs to be ground down. Per pt Kirsten wants to see her asap. Please advise

## 2024-03-19 ENCOUNTER — OFFICE VISIT (OUTPATIENT)
Dept: AUDIOLOGY | Facility: CLINIC | Age: 84
End: 2024-03-19

## 2024-03-19 DIAGNOSIS — H90.3 SENSORINEURAL HEARING LOSS, BILATERAL: Primary | ICD-10-CM

## 2024-03-19 PROCEDURE — 92592 HEARING AID CHECK, ONE EAR: CPT | Performed by: AUDIOLOGIST

## 2024-03-19 NOTE — PROGRESS NOTES
HEARING AID FOLLOW-UP    Gloria Robins  11/12/1940  [id number]    Patient is here as right aid is still hurting her ear.  She feels soreness still at back of EAC and bowl of ear.    Otoscopic: no obivous sores or redness    Ground hearing aid down overall and concentrated on those areas.  Concern is hearing aid mat be too \" thin\" now. Patient will try aid and if feels ok, she will wear for awhile, but advised we should send in to [employer] ( under warranty until 9/2024) for them to seal any thin areas.  No charge entered for today and no charge when sent right aid to [employer] if before 9/2024    3/19/2024  Jacob Carballo

## 2024-04-08 ENCOUNTER — TELEPHONE (OUTPATIENT)
Dept: AUDIOLOGY | Facility: CLINIC | Age: 84
End: 2024-04-08

## 2024-04-08 NOTE — TELEPHONE ENCOUNTER
Spoke to patient - still lots of discomfort with aid. Advised to keep out for 2-3 days. Made appt for 4/11 for new earmold impression. Will send to [employer] for remake with impression and hearing aid

## 2024-04-08 NOTE — TELEPHONE ENCOUNTER
Patient called to request tim return her call, patient is currently having hearing aid issues, tried to schedule patient in for within next 24 hours and cancellation or appointment is available until 05/14

## 2024-04-11 ENCOUNTER — OFFICE VISIT (OUTPATIENT)
Dept: AUDIOLOGY | Facility: CLINIC | Age: 84
End: 2024-04-11

## 2024-04-11 DIAGNOSIS — H90.3 SENSORINEURAL HEARING LOSS, BILATERAL: Primary | ICD-10-CM

## 2024-04-11 PROCEDURE — 92592 HEARING AID CHECK, ONE EAR: CPT | Performed by: AUDIOLOGIST

## 2024-04-11 NOTE — PROGRESS NOTES
HEARING AID FOLLOW-UP    Gloria OCAMPO Justinmesarkis  11/12/1940  [id number]        Hearing Aid Information       Right    Left   Make: Oticon Make: [employer]   Model: OPN 2 FS Model: OPN 2 FS   Serial Number: [id number] Serial Number: [id number]   Date of Purchase: 09/04/19 Date of Purchase: 09/04/19   Repair Warranty Exp: 09/04/24 Repair Warranty Exp: 09/04/24   L&D Warranty Exp: 09/04/24 L&D Warranty Exp: 09/04/24   Color: beige Color: beige   Battery: 13 Battery: 13         The patient has the following concerns:   right aid still causing great discomfort around frame of ear  Cannot grind anymore without cracking shell    Otoscopic Inspection:  right ear: no cerumen and TM visualized    Took new earmold impression of right ear without incident.  Ear is very narrow at curve  Sent impression and right aid to [employer] for remake    Call patient for quick appointment with SS when aid returns from [employer]    No charges entered     4/11/2024  Jacob Carballo

## 2024-04-23 ENCOUNTER — TELEPHONE (OUTPATIENT)
Dept: AUDIOLOGY | Facility: CLINIC | Age: 84
End: 2024-04-23

## 2024-04-23 NOTE — TELEPHONE ENCOUNTER
LMVM that remake of right aid is back from [employer]    Left aid for    Advised to let me know if any problems persist

## 2024-05-13 NOTE — ED PROVIDER NOTES
1st attempt to contact patient for scheduling consultation with GI provider - [facility]      Dx: Elevated liver enzymes     Comments: Elevated liver enzymes, possible gerd    Pt seen in [facility] [place]      Stated Complaint: Patient presents with:  Cough/URI      --------------   RN assessment:     St since last night    --------------    CC: st    HPI:  Aaron Becerra is a 76year old female w hallucinations  10 point review of systems is otherwise negative.     Objective   Vitals Ranges and Last Value:  ED Triage Vitals [07/07/18 1329]  BP: 151/59  Pulse: 102  Resp: 20  Temp: 98 °F (36.7 °C)  Temp src: Oral  SpO2: 98 %  O2 Device: None (Room air [address] [address]  [phone]    Go to   As needed, If symptoms worsen        Medications Prescribed:    Current Discharge Medication List

## 2024-06-01 ENCOUNTER — OFFICE VISIT (OUTPATIENT)
Dept: INTERNAL MEDICINE CLINIC | Facility: CLINIC | Age: 84
End: 2024-06-01
Payer: MEDICARE

## 2024-06-01 DIAGNOSIS — Z13.0 SCREENING FOR DEFICIENCY ANEMIA: ICD-10-CM

## 2024-06-01 DIAGNOSIS — Z00.00 WELLNESS EXAMINATION: ICD-10-CM

## 2024-06-01 DIAGNOSIS — I10 HYPERTENSION, UNSPECIFIED TYPE: Primary | ICD-10-CM

## 2024-06-01 DIAGNOSIS — L71.9 ACNE ROSACEA: ICD-10-CM

## 2024-06-01 DIAGNOSIS — E78.00 ELEVATED CHOLESTEROL: ICD-10-CM

## 2024-06-01 DIAGNOSIS — Z13.220 LIPID SCREENING: ICD-10-CM

## 2024-06-01 DIAGNOSIS — Z13.29 THYROID DISORDER SCREEN: ICD-10-CM

## 2024-06-01 LAB
ALBUMIN SERPL-MCNC: 4.5 G/DL (ref 3.2–4.8)
ALBUMIN/GLOB SERPL: 1.2 {RATIO} (ref 1–2)
ALP LIVER SERPL-CCNC: 153 U/L
ALT SERPL-CCNC: 23 U/L
ANION GAP SERPL CALC-SCNC: 6 MMOL/L (ref 0–18)
AST SERPL-CCNC: 27 U/L (ref ?–34)
BASOPHILS # BLD AUTO: 0.03 X10(3) UL (ref 0–0.2)
BASOPHILS NFR BLD AUTO: 0.6 %
BILIRUB SERPL-MCNC: 0.7 MG/DL (ref 0.2–1.1)
BUN BLD-MCNC: 11 MG/DL (ref 9–23)
BUN/CREAT SERPL: 15.9 (ref 10–20)
CALCIUM BLD-MCNC: 11 MG/DL (ref 8.7–10.4)
CHLORIDE SERPL-SCNC: 107 MMOL/L (ref 98–112)
CHOLEST SERPL-MCNC: 234 MG/DL (ref ?–200)
CO2 SERPL-SCNC: 26 MMOL/L (ref 21–32)
CREAT BLD-MCNC: 0.69 MG/DL
DEPRECATED RDW RBC AUTO: 49.3 FL (ref 35.1–46.3)
EGFRCR SERPLBLD CKD-EPI 2021: 85 ML/MIN/1.73M2 (ref 60–?)
EOSINOPHIL # BLD AUTO: 0.06 X10(3) UL (ref 0–0.7)
EOSINOPHIL NFR BLD AUTO: 1.2 %
ERYTHROCYTE [DISTWIDTH] IN BLOOD BY AUTOMATED COUNT: 13.1 % (ref 11–15)
FASTING PATIENT LIPID ANSWER: YES
FASTING STATUS PATIENT QL REPORTED: YES
GLOBULIN PLAS-MCNC: 3.7 G/DL (ref 2–3.5)
GLUCOSE BLD-MCNC: 101 MG/DL (ref 70–99)
HCT VFR BLD AUTO: 43.9 %
HDLC SERPL-MCNC: 97 MG/DL (ref 40–59)
HGB BLD-MCNC: 14.7 G/DL
IMM GRANULOCYTES # BLD AUTO: 0 X10(3) UL (ref 0–1)
IMM GRANULOCYTES NFR BLD: 0 %
LDLC SERPL CALC-MCNC: 125 MG/DL (ref ?–100)
LYMPHOCYTES # BLD AUTO: 1.23 X10(3) UL (ref 1–4)
LYMPHOCYTES NFR BLD AUTO: 24.3 %
MCH RBC QN AUTO: 33.9 PG (ref 26–34)
MCHC RBC AUTO-ENTMCNC: 33.5 G/DL (ref 31–37)
MCV RBC AUTO: 101.4 FL
MONOCYTES # BLD AUTO: 0.41 X10(3) UL (ref 0.1–1)
MONOCYTES NFR BLD AUTO: 8.1 %
NEUTROPHILS # BLD AUTO: 3.34 X10 (3) UL (ref 1.5–7.7)
NEUTROPHILS # BLD AUTO: 3.34 X10(3) UL (ref 1.5–7.7)
NEUTROPHILS NFR BLD AUTO: 65.8 %
NONHDLC SERPL-MCNC: 137 MG/DL (ref ?–130)
OSMOLALITY SERPL CALC.SUM OF ELEC: 288 MOSM/KG (ref 275–295)
PLATELET # BLD AUTO: 229 10(3)UL (ref 150–450)
POTASSIUM SERPL-SCNC: 4.7 MMOL/L (ref 3.5–5.1)
PROT SERPL-MCNC: 8.2 G/DL (ref 5.7–8.2)
RBC # BLD AUTO: 4.33 X10(6)UL
SODIUM SERPL-SCNC: 139 MMOL/L (ref 136–145)
TRIGL SERPL-MCNC: 72 MG/DL (ref 30–149)
TSI SER-ACNC: 4.09 MIU/ML (ref 0.55–4.78)
VLDLC SERPL CALC-MCNC: 13 MG/DL (ref 0–30)
WBC # BLD AUTO: 5.1 X10(3) UL (ref 4–11)

## 2024-06-01 PROCEDURE — 80053 COMPREHEN METABOLIC PANEL: CPT

## 2024-06-01 PROCEDURE — 80061 LIPID PANEL: CPT

## 2024-06-01 PROCEDURE — 84443 ASSAY THYROID STIM HORMONE: CPT

## 2024-06-01 PROCEDURE — 85025 COMPLETE CBC W/AUTO DIFF WBC: CPT

## 2024-06-02 VITALS
DIASTOLIC BLOOD PRESSURE: 88 MMHG | SYSTOLIC BLOOD PRESSURE: 154 MMHG | HEIGHT: 61 IN | HEART RATE: 82 BPM | WEIGHT: 157.38 LBS | TEMPERATURE: 98 F | OXYGEN SATURATION: 96 % | BODY MASS INDEX: 29.71 KG/M2

## 2024-06-03 NOTE — PROGRESS NOTES
CHIEF COMPLAINT:     Chief Complaint   Patient presents with    Other     New patient- Establishing care.        HPI:   Gloria Robins is a 83 year old female who presents to establish care.  Primary care provider retired.  Currently not on any prescription oral medications, only eyedrops for glaucoma  Past history of elevated blood pressure in your cholesterolemia.  Mild rosacea.  No longer has trouble with UTIs.  Patient attributes this to less stress.  Retired from management position    Current Outpatient Medications   Medication Sig Dispense Refill    latanoprost 0.005 % Ophthalmic Solution Place 1 drop into both eyes nightly.      REFRESH LACRI-LUBE Ophthalmic Ointment 3 (three) times daily.      Polyethyl Glycol-Propyl Glycol (SYSTANE ULTRA HOME-AWAY PACK) 0.4-0.3 % Ophthalmic Solution Place 1 drop into the right eye as needed.        Past Medical History:    Allergic rhinitis    Hypertension    Obesity    Osteoarthritis    Recurrent UTI      Social History:  Social History     Socioeconomic History    Marital status:    Tobacco Use    Smoking status: Never    Smokeless tobacco: Never   Vaping Use    Vaping status: Never Used   Substance and Sexual Activity    Alcohol use: Yes     Alcohol/week: 7.0 standard drinks of alcohol     Types: 7 Standard drinks or equivalent per week     Comment: 1.5 oz martini per day    Drug use: Never   Other Topics Concern    Caffeine Concern Yes    Pt has a pacemaker No    Pt has a defibrillator No    Reaction to local anesthetic No        REVIEW OF SYSTEMS:   GENERAL: Denies fever, chills,weight change, decreased appetite  SKIN: Mild rosacea, had tried oral and topical antibiotics without improvement  EYES: Denies blurred vision or double vision.  Sees ophthalmologist for increased pressures  HENT: Denies congestion, ear pain.  Bilateral hearing aids.  Has occasional runny nose with her environmental allergies.  CHEST: Denies chest pain, or palpitations.  Mows her  own lawn and tries to stay active  LUNGS: Denies shortness of breath, cough, or wheezing  NEURO: Denies headaches or lightheadedness    EXAM:   /80   Pulse 82   Temp 97.6 °F (36.4 °C)   Ht 5' 1\" (1.549 m)   Wt 157 lb 6.4 oz (71.4 kg)   SpO2 96%   BMI 29.74 kg/m²   Vitals:    06/01/24 1009 06/01/24 1025   BP: 150/80 154/88   Pulse: 82    Temp: 97.6 °F (36.4 °C)    SpO2: 96%    Weight: 157 lb 6.4 oz (71.4 kg)    Height: 5' 1\" (1.549 m)        GENERAL: well developed, well nourished,in no apparent distress  SKIN: Pink tones to nose without prominent capillaries.  Has red papules to right distal nose without pearly edge or prominent capillaries.  HEAD: atraumatic, normocephalic  EYES: conjunctiva clear, EOM intact, PERRLA  EARS: TM's gray, no bulging, no retraction, no fluid, Scars on Left TM, distorted TM (from \"childhood injury\")  NOSE: nostrils patent, clear exudates, nasal mucosa pink and noninflamed  THROAT: oral mucosa pink, moist. No visible dental caries. Posterior pharynx is not erythematous. No tonsillar exudates.  NECK: supple, non-tender  LUNGS: clear to auscultation bilaterally, no wheezes or rhonchi. Breathing is non labored.  CARDIO: RRR without murmur  GI: No visible scars, or masses. BS's present x4. No palpable masses or hepatosplenomegaly.  no tenderness on palpation  :Tissue is normal color for ethnicity, moist, without rash or redness  EXTREMITIES: no cyanosis, clubbing or edema  LYMPH:  no cerv. lymphadenopathy.      ASSESSMENT AND PLAN:     ASSESSMENT/PLAN:    1. Hypertension, unspecified type  Patient does not want blood pressure medication treatment    2. Lipid screening, Hx- Elevated cholesterol-hx, not on prescription medications  History of elevated cholesterol  - Lipid Panel; Future  - Lipid Panel    3. Screening for deficiency anemia    - CBC With Differential With Platelet; Future  - CBC With Differential With Platelet    4. Thyroid disorder screen    - TSH W Reflex To Free  T4; Future  - TSH W Reflex To Free T4    5. Wellness examination  Labs drawn for future Medicare wellness exam/visit  - Comp Metabolic Panel (14); Future  - CBC With Differential With Platelet; Future  - Lipid Panel; Future  - TSH W Reflex To Free T4; Future  - Comp Metabolic Panel (14)  - CBC With Differential With Platelet  - Lipid Panel  - TSH W Reflex To Free T4    6. Rosacea- declines Rx treatment. Has 1-1.5 alcoholic drinks per tito.    Labs drawn in preparation for  Return for annual Medicare Wellness exam    No orders of the defined types were placed in this encounter.      BACILIO Doty      The patient indicates understanding of these issues and agrees to the plan.

## 2024-06-13 ENCOUNTER — TELEPHONE (OUTPATIENT)
Dept: INTERNAL MEDICINE CLINIC | Facility: CLINIC | Age: 84
End: 2024-06-13

## 2024-06-13 NOTE — TELEPHONE ENCOUNTER
Patient walked in stating she would like to get a call to get her blood work results. Please call to advise.

## 2024-06-14 ENCOUNTER — TELEPHONE (OUTPATIENT)
Dept: INTERNAL MEDICINE CLINIC | Facility: CLINIC | Age: 84
End: 2024-06-14

## 2024-06-14 NOTE — TELEPHONE ENCOUNTER
Phone call follow up after message sent 11 days ago. \"Your blood tests look pretty good.  There are a few minor abnormalities just outside of the normal range for all age adults, but we do not really need to do anything them..  I did have a question -wondering if you are on calcium supplements? Your calcium level is elevated so you do not need to take extra calcium as a supplement.  This may also be in the form of antacids, such as TUMS or ROLAIDS.  Calcium level was also mildly elevated 3 years ago.  Cholesterol levels are mildly elevated but not worth being on the medication at this time.  Thyroid function/TSH is ok, less than or better than 3 years ago.  If you have more questions about that, we can talk about it at your annual physical\"

## 2024-08-28 ENCOUNTER — TELEPHONE (OUTPATIENT)
Dept: AUDIOLOGY | Facility: CLINIC | Age: 84
End: 2024-08-28

## 2024-08-28 NOTE — TELEPHONE ENCOUNTER
Spoke to patient- advised extended hearing aid warranty letter was sent in error.   Her hearing aids are now 5 years old and cannot extend 's warranty any longer  She is in understanding - advised she can check into her homeowner's insurance if she'd like

## 2024-12-05 ENCOUNTER — HOSPITAL ENCOUNTER (OUTPATIENT)
Age: 84
Discharge: HOME | End: 2024-12-05
Attending: EMERGENCY MEDICINE
Payer: MEDICARE

## 2024-12-05 VITALS
OXYGEN SATURATION: 98 % | HEART RATE: 93 BPM | DIASTOLIC BLOOD PRESSURE: 58 MMHG | TEMPERATURE: 97 F | RESPIRATION RATE: 17 BRPM | SYSTOLIC BLOOD PRESSURE: 158 MMHG

## 2024-12-05 DIAGNOSIS — H92.02 LEFT EAR PAIN: Primary | ICD-10-CM

## 2024-12-05 PROCEDURE — 99213 OFFICE O/P EST LOW 20 MIN: CPT

## 2024-12-05 PROCEDURE — 99212 OFFICE O/P EST SF 10 MIN: CPT

## 2024-12-05 NOTE — ED INITIAL ASSESSMENT (HPI)
Patient with left ear pain. States it is stabbing pains since Monday night   No fever  No drainage

## 2024-12-05 NOTE — DISCHARGE INSTRUCTIONS
Take 2 Advil (400 mg) every 8 hours for the next 2 to 3 days and then as needed for pain thereafter.    If pain is not better by Monday see Dr. Bradley for follow-up.

## 2024-12-06 NOTE — ED PROVIDER NOTES
Patient Seen in: [facility]      History     Chief Complaint   Patient presents with    Ear Pain     Stated Complaint: Left  Ear Pain    Subjective:   HPI      84-year-old female presents for evaluation of left ear pain.  Patient reports stabbing pain intermittently since Monday.  Resolves with Tylenol or ibuprofen.  No fevers.    Objective:     No pertinent past medical history.            No pertinent past surgical history.              No pertinent social history.            Review of Systems    Positive for stated complaint: Left  Ear Pain  Other systems are as noted in HPI.  Constitutional and vital signs reviewed.      All other systems reviewed and negative except as noted above.    Physical Exam     ED Triage Vitals [12/05/24 1517]   /58   Pulse 93   Resp 17   Temp 97.1 °F (36.2 °C)   Temp src Oral   SpO2 98 %   O2 Device None (Room air)       Current Vitals:   Vital Signs  BP: 158/58  Pulse: 93  Resp: 17  Temp: 97.1 °F (36.2 °C)  Temp src: Oral    Oxygen Therapy  SpO2: 98 %  O2 Device: None (Room air)        Physical Exam  Vitals and nursing note reviewed.   Constitutional:       General: She is not in acute distress.     Appearance: Normal appearance. She is not ill-appearing or toxic-appearing.   HENT:      Head: Normocephalic and atraumatic.      Right Ear: Tympanic membrane, ear canal and external ear normal. There is no impacted cerumen.      Left Ear: Tympanic membrane, ear canal and external ear normal. There is no impacted cerumen.   Eyes:      Conjunctiva/sclera: Conjunctivae normal.   Cardiovascular:      Rate and Rhythm: Normal rate.   Pulmonary:      Effort: Pulmonary effort is normal. No respiratory distress.   Musculoskeletal:         General: Normal range of motion.      Cervical back: Normal range of motion. No rigidity.   Neurological:      General: No focal deficit present.      Mental Status: She is alert.   Psychiatric:         Mood and Affect: Mood normal.              ED Course   Labs Reviewed - No data to display                MDM             Medical Decision Making  No evidence of infection, advised supportive care, NSAIDs, outpatient follow-up with otolaryngology if not improving in the next few days.  Patient verbalizes understanding of and agreement with this plan.    Problems Addressed:  Left ear pain: acute illness or injury    Risk  OTC drugs.        Disposition and Plan     Clinical Impression:  1. Left ear pain         Disposition:  Discharge  12/5/2024  3:48 pm    Follow-up:  Guille Bradley MD  [address] [place] [id number]  [phone]      As needed          Medications Prescribed:  Discharge Medication List as of 12/5/2024  3:49 PM              Supplementary Documentation:

## 2024-12-19 ENCOUNTER — OFFICE VISIT (OUTPATIENT)
Dept: INTERNAL MEDICINE CLINIC | Facility: CLINIC | Age: 84
End: 2024-12-19
Payer: MEDICARE

## 2024-12-19 VITALS
DIASTOLIC BLOOD PRESSURE: 66 MMHG | OXYGEN SATURATION: 97 % | HEIGHT: 61 IN | SYSTOLIC BLOOD PRESSURE: 130 MMHG | WEIGHT: 155 LBS | BODY MASS INDEX: 29.27 KG/M2 | HEART RATE: 81 BPM | TEMPERATURE: 98 F

## 2024-12-19 DIAGNOSIS — I65.22 ATHEROSCLEROSIS OF LEFT CAROTID ARTERY: Primary | ICD-10-CM

## 2024-12-19 DIAGNOSIS — I70.0 ATHEROSCLEROSIS OF ABDOMINAL AORTA (HCC): ICD-10-CM

## 2024-12-19 PROCEDURE — 99213 OFFICE O/P EST LOW 20 MIN: CPT

## 2024-12-19 NOTE — PROGRESS NOTES
CHIEF COMPLAINT:     Chief Complaint   Patient presents with    Pain     Pain management and discuss referrals.        HPI:   Gloria Robins is a 84 year old female who presents with concerns for blockages in her aorta and left carotid artery as noted on x-rays done at chiropractor-Dr. Torrie Berger DC. Pt has had back and neck pain and is happy with the services of the Chiropractor. Discussed PT at [facility] but patient is not interested and states she had to wait almost 2 weeks to get started.  Requesting testing to confirm site and severity of atherosclerosis.  Denies headaches, dizziness, or visual changes.  Denies abdominal pain, denies coldness of extremities especially feet.  Non-smoker.  No history of hypertension, CVA nor MI.    Current Outpatient Medications   Medication Sig Dispense Refill    latanoprost 0.005 % Ophthalmic Solution Place 1 drop into both eyes nightly. (Patient not taking: Reported on 12/19/2024)      REFRESH LACRI-LUBE Ophthalmic Ointment 3 (three) times daily. (Patient not taking: Reported on 12/19/2024)      Polyethyl Glycol-Propyl Glycol (SYSTANE ULTRA HOME-AWAY PACK) 0.4-0.3 % Ophthalmic Solution Place 1 drop into the right eye as needed. (Patient not taking: Reported on 12/19/2024)        Past Medical History:    Allergic rhinitis    Hypertension    Obesity    Osteoarthritis    Recurrent UTI      Social History:  Social History     Socioeconomic History    Marital status:    Tobacco Use    Smoking status: Never    Smokeless tobacco: Never   Vaping Use    Vaping status: Never Used   Substance and Sexual Activity    Alcohol use: Yes     Alcohol/week: 7.0 standard drinks of alcohol     Types: 7 Standard drinks or equivalent per week     Comment: 1.5 oz martini per day    Drug use: Never   Other Topics Concern    Caffeine Concern Yes    Pt has a pacemaker No    Pt has a defibrillator No    Reaction to local anesthetic No        REVIEW OF SYSTEMS:   GENERAL:  No fever, chills, unexplained weight change,   SKIN: Denies rashes, skin wounds or ulcers.creased appetite  EYES: No blurred or change of vision  CHEST: No chest pain, or palpitations  LUNGS: Denies shortness of breath, cough, or wheezing  GI: Denies abdominal pain, N/V/C.   : Denies urinary pain, had a prior history of frequent UTIs which has resolved.  MUSCULOSKELETAL: no new arthralgia or swollen joints  NEURO: Denies headaches or lightheadedness    EXAM:   /66   Pulse 81   Temp 97.6 °F (36.4 °C)   Ht 5' 1\" (1.549 m)   Wt 155 lb (70.3 kg)   SpO2 97%   BMI 29.29 kg/m²   Vitals:    12/19/24 1357   BP: 130/66   Pulse: 81   Temp: 97.6 °F (36.4 °C)   SpO2: 97%   Weight: 155 lb (70.3 kg)   Height: 5' 1\" (1.549 m)       GENERAL: well developed, well nourished,in no apparent distress  HEAD: atraumatic,   Abdomen: Positive bowel sounds no at bedtime megaly or masses no guarding.     Faint palpable aortic pulse but not widened nontender  LUNGS: clear to auscultation bilaterally, no wheezes or rhonchi. Breathing is non labored.  CARDIO: RRR without murmur.  Carotids without bruits  EXTREMITIES: no cyanosis,or edema.  No chronic PVD changes of feet.  Toes are slightly warm with 2-second capillary refill  CV: No appreciable bruits auscultated nor palpated over abdominal aorta nor femoral artery areas    ASSESSMENT AND PLAN:     ASSESSMENT/PLAN:    1. Atherosclerosis of left carotid artery  Discussed limitations of x-rays to determine quality of blood flow.  - US CAROTID DOPPLER BILAT - DIAG IMG (CPT=93880); Future  - CT CHEST PE AORTA (IV ONLY) (CPT=71260); Future    2. Atherosclerosis of abdominal aorta (HCC)  Lumbar x-rays described: \"Punctate calcification of abdominal aorta\"  - CT CHEST PE AORTA (IV ONLY) (CPT=71260); Future      No orders of the defined types were placed in this encounter.    Request CD copy of x-rays from chiropractor done on 12/10/2024-lumbar spine 6 views and cervical spine 2 views.   Patient brought in and gave copies of printed reports: To be scanned in      There are no Patient Instructions on file for this visit.    BACILIO Doty      The patient indicates understanding of these issues and agrees to the plan.

## 2024-12-30 ENCOUNTER — TELEPHONE (OUTPATIENT)
Dept: INTERNAL MEDICINE CLINIC | Facility: CLINIC | Age: 84
End: 2024-12-30

## 2024-12-30 DIAGNOSIS — I65.22 ATHEROSCLEROSIS OF LEFT CAROTID ARTERY: ICD-10-CM

## 2024-12-30 DIAGNOSIS — I70.0 ATHEROSCLEROSIS OF ABDOMINAL AORTA (HCC): Primary | ICD-10-CM

## 2025-01-03 ENCOUNTER — TELEPHONE (OUTPATIENT)
Dept: INTERNAL MEDICINE CLINIC | Facility: CLINIC | Age: 85
End: 2025-01-03

## 2025-01-03 NOTE — TELEPHONE ENCOUNTER
Patient called the office.  She tried to schedule her (2) ultrasounds but said she needed authorization to do that.    She is asking for a call back from the nurse.

## 2025-01-03 NOTE — TELEPHONE ENCOUNTER
Cindy talked to me after this and had changed the ordering to the ultrasound, no CT    So two ultrasounds were ordered

## 2025-01-03 NOTE — TELEPHONE ENCOUNTER
Patient requesting authorization for imaging. In Cindy ALLEN's office notes she was ordering - CT CHEST PE AORTA (IV ONLY) (CPT=71260) and US CAROTID DOPPLER BILAT - DIAG IMG (CPT=93880).     Orders entered are for US ABDOMINAL AORTA COMPLETE  (CPT=76770) ([id number]) [[id number]] and US CAROTID DOPPLER BILAT - DIAG IMG (CPT=93880) ([id number]) [[id number]].     Under referral tab CT Chest was closed.       Please clarify what imaging orders need review.

## 2025-01-06 NOTE — TELEPHONE ENCOUNTER
Spoke with  [facility] states cartoid and abdominal aorta have been authorized but there is no record she called their department for scheduling.

## 2025-01-06 NOTE — TELEPHONE ENCOUNTER
Unsuccessful attempt calling El[facility] with constant busy signal. Will re-attempt contact at a later time.     Advised Gloria to call central scheduling to schedule ultrasounds.

## 2025-01-09 ENCOUNTER — MED REC SCAN ONLY (OUTPATIENT)
Dept: INTERNAL MEDICINE CLINIC | Facility: CLINIC | Age: 85
End: 2025-01-09

## 2025-01-09 ENCOUNTER — OFFICE VISIT (OUTPATIENT)
Dept: INTERNAL MEDICINE CLINIC | Facility: CLINIC | Age: 85
End: 2025-01-09
Payer: MEDICARE

## 2025-01-09 VITALS
DIASTOLIC BLOOD PRESSURE: 60 MMHG | WEIGHT: 156.38 LBS | BODY MASS INDEX: 29.52 KG/M2 | HEART RATE: 102 BPM | HEIGHT: 61 IN | OXYGEN SATURATION: 100 % | SYSTOLIC BLOOD PRESSURE: 170 MMHG

## 2025-01-09 DIAGNOSIS — I10 HYPERTENSION, UNSPECIFIED TYPE: Primary | ICD-10-CM

## 2025-01-09 DIAGNOSIS — I65.22 CAROTID ATHEROSCLEROSIS, LEFT: ICD-10-CM

## 2025-01-09 DIAGNOSIS — I70.0 ATHEROSCLEROSIS OF ABDOMINAL AORTA (HCC): ICD-10-CM

## 2025-01-09 DIAGNOSIS — M54.50 LOW BACK PAIN, UNSPECIFIED BACK PAIN LATERALITY, UNSPECIFIED CHRONICITY, UNSPECIFIED WHETHER SCIATICA PRESENT: ICD-10-CM

## 2025-01-09 RX ORDER — LISINOPRIL 2.5 MG/1
2.5 TABLET ORAL DAILY
Qty: 30 TABLET | Refills: 1 | Status: SHIPPED | OUTPATIENT
Start: 2025-01-09

## 2025-01-09 NOTE — PROGRESS NOTES
CC:  No chief complaint on file.      Hx of CC:    New pt- [facility]  Was seeing Dr. Yelena Banda for years  Saw Cindy Pinedo NP once    PMhx:   Back pain  Glaucoma  Hearing problems- her whole life . Now hearing aids        Pt has concerns for blockages in her abdominal aorta and left carotid artery as noted on x-rays done at chiropractor-Dr. Torrie Garrison-[facility]LYN Nava. Pt has had back that she is seeing the chiropractor for      Non-smoker.  No history of hypertension, CVA nor MI.   No palpitations, dizziness or headaches    Has had some mildly elevated blood pressures at the doctor's office before  Her sister was on blood pressure medicine and is 90    Lives alone  Drives, does stairs  Shops 3-4 times a week and has a luggage cart     3 cats  Mom lived to be 89    She has a POA    Allergies:  Allergies[1]   Current Meds:  Current Outpatient Medications   Medication Sig Dispense Refill    lisinopril 2.5 MG Oral Tab Take 1 tablet (2.5 mg total) by mouth daily. 30 tablet 1    latanoprost 0.005 % Ophthalmic Solution Place 1 drop into both eyes nightly.      REFRESH LACRI-LUBE Ophthalmic Ointment 3 (three) times daily. (Patient not taking: Reported on 2025)      Polyethyl Glycol-Propyl Glycol (SYSTANE ULTRA HOME-AWAY PACK) 0.4-0.3 % Ophthalmic Solution Place 1 drop into the right eye as needed. (Patient not taking: Reported on 2025)          History:  Past Medical History:    Allergic rhinitis    Hypertension    Obesity    Osteoarthritis    Recurrent UTI      Past Surgical History:   Procedure Laterality Date    Cataract      Cataract extraction w/  intraocular lens implant      Inner ear surgery proc unlisted      Inner ear surgery proc unlisted      Other surgical history      bilat ear       Family History   Problem Relation Age of Onset    Other (unknown) Father 75    Other (ileus) Mother 89    Hypertension Sister     Diabetes Brother       Family Status   Relation Status    Fa     Mo      Sis (Not Specified)    Bro (Not Specified)      Social History     Socioeconomic History    Marital status:    Tobacco Use    Smoking status: Never    Smokeless tobacco: Never   Vaping Use    Vaping status: Never Used   Substance and Sexual Activity    Alcohol use: Yes     Alcohol/week: 7.0 standard drinks of alcohol     Types: 7 Standard drinks or equivalent per week     Comment: 1.5 oz martini per day    Drug use: Never   Other Topics Concern    Caffeine Concern Yes    Pt has a pacemaker No    Pt has a defibrillator No    Reaction to local anesthetic No            ROS:  General:  No fever, no fatigue, no weight changes  HEENT:  Denies congestion or nasal discharge  Cardio:  No chest pain   Pulmonary:  No cough, no SOB  GI:  No N/V/D    Physical:    BP (!) 170/60   Pulse 102   Ht 5' 1\" (1.549 m)   Wt 156 lb 6.4 oz (70.9 kg)   SpO2 100%   BMI 29.55 kg/m²     General:  Alert, appropriate, no acute distress  HEENT:  Normocephalic, supple. Moist mucus membranes.  Neck: Supple.  No bruit heard.  Cardio:  RRR, no murmurs, S1, S2  Pulmonary:  Clear bilaterally, good air entry  Abdomen: Soft, nontender, no bruits heard or masses felt  Dermatologic:  No rashes or lesions  EXT: no edema  MS: normal movement   NEURO: no gross deficits     Reviewed x-ray  report of her cervical spine and lumbar spine will  be scanned in.  Findings on the cervical spine x-ray include calcific atherosclerosis of the left carotid artery and on the lumbar spine include calcific atherosclerosis of the abdominal aorta    Assessment and Plan:    Healthy active 84-year-old on no medication who lives alone.    1. Hypertension, unspecified type  Her blood pressure is a little elevated and has been on prior visits also.  Will try very low-dose lisinopril and have her follow-up in 2 to 4 weeks.  If lightheaded or dizzy at all she should stop it.  She can get EKG at the hospital or we can do it at her next visit  - lisinopril 2.5 MG Oral Tab;  Take 1 tablet (2.5 mg total) by mouth daily.  Dispense: 30 tablet; Refill: 1  - EKG 12 Lead to be performed at [facility]; Future    2. Low back pain, unspecified back pain laterality, unspecified chronicity, unspecified whether sciatica present  Offered evaluation and referrals for this but she would like to  continue seeing the chiropractor    3. Carotid atherosclerosis, left  Discussed that this is not uncommon to find given her age.  Her lipid panel in June 2024 was very good.  Discussed starting low-dose cholesterol medication.  She would prefer to hold off.  Can check carotid Dopplers to look at this finding more.  She has no symptoms of disease.  - US CAROTID DOPPLER BILAT - DIAG IMG (CPT=93880); Future    4. Atherosclerosis of abdominal aorta (HCC)  Same as #3  - US ABDOMINAL AORTA COMPLETE  (CPT=76770); Future      Follow-up 2 to 4 weeks for blood pressure check and AWV.  If any side effects on blood pressure medication she should stop it    Spent over 30  minutes obtaining history, reviewing chart and labs, evaluating patient, discussing treatment options, educating patient on disease progress and completing documentation.    There are no diagnoses linked to this encounter.  EKG 12-LEAD  US CAROTID DOPPLER BILAT - DIAG IMG (CPT=93880)  US ABDOMINAL AORTA COMPLETE  (CPT=76770)  No orders of the defined types were placed in this encounter.             [1]   Allergies  Allergen Reactions    Wasp Venom RASH and SWELLING    Aspirin PAIN     stomach

## 2025-01-10 ENCOUNTER — EKG ENCOUNTER (OUTPATIENT)
Dept: LAB | Age: 85
End: 2025-01-10
Attending: FAMILY MEDICINE
Payer: MEDICARE

## 2025-01-10 DIAGNOSIS — I10 HYPERTENSION, UNSPECIFIED TYPE: ICD-10-CM

## 2025-01-10 LAB
ATRIAL RATE: 85 BPM
P AXIS: 60 DEGREES
P-R INTERVAL: 208 MS
Q-T INTERVAL: 342 MS
QRS DURATION: 72 MS
QTC CALCULATION (BEZET): 406 MS
R AXIS: -15 DEGREES
T AXIS: 47 DEGREES
VENTRICULAR RATE: 85 BPM

## 2025-01-10 PROCEDURE — 93005 ELECTROCARDIOGRAM TRACING: CPT

## 2025-01-10 PROCEDURE — 93010 ELECTROCARDIOGRAM REPORT: CPT | Performed by: INTERNAL MEDICINE

## 2025-01-14 ENCOUNTER — AUDIOLOGY DOCUMENTATION (OUTPATIENT)
Dept: AUDIOLOGY | Facility: CLINIC | Age: 85
End: 2025-01-14

## 2025-01-15 NOTE — PROGRESS NOTES
Hearing Aid Information       Right    Left   Make: Oticon Make: Oticon   Model: OPN 2 FS Model: OPN 2 FS   Serial Number: [id number] Serial Number: [id number]   Date of Purchase: 09/04/19 Date of Purchase: 09/04/19   Repair Warranty Exp: 09/04/24 Repair Warranty Exp: 09/04/24   L&D Warranty Exp: 09/04/24 L&D Warranty Exp: 09/04/24   Color: beige Color: beige   Battery: 13 Battery: 13       Patient dropped off left aid with report that it was not working.   Initial check was good on aid.   Called patient to discuss and she states it has been intermittent for more than a month.   Vidal not feel the problem is related to batteries.    Decided to go ahead and send aid for repair.  Quoted $450 since aid is greater than 5 years old.   Will call patient for  when it comes back.  Charges not entered yet.

## 2025-01-27 ENCOUNTER — TELEPHONE (OUTPATIENT)
Dept: AUDIOLOGY | Facility: CLINIC | Age: 85
End: 2025-01-27

## 2025-01-27 NOTE — TELEPHONE ENCOUNTER
Spoke with patient. Her hearing aid is back from repair and ready for  at the reception desk.   Charges of $450 entered today. New warranty good for 6 months.      Brooke Faith

## 2025-02-18 ENCOUNTER — HOSPITAL ENCOUNTER (OUTPATIENT)
Dept: ULTRASOUND IMAGING | Age: 85
Discharge: HOME | End: 2025-02-18
Attending: FAMILY MEDICINE
Payer: MEDICARE

## 2025-02-18 DIAGNOSIS — I65.22 CAROTID ATHEROSCLEROSIS, LEFT: ICD-10-CM

## 2025-02-18 DIAGNOSIS — I70.0 ATHEROSCLEROSIS OF ABDOMINAL AORTA: ICD-10-CM

## 2025-02-18 PROCEDURE — 76770 US EXAM ABDO BACK WALL COMP: CPT | Performed by: FAMILY MEDICINE

## 2025-02-18 PROCEDURE — 93880 EXTRACRANIAL BILAT STUDY: CPT | Performed by: FAMILY MEDICINE

## 2025-02-19 NOTE — PROGRESS NOTES
Subjective:   Gloria Robins is a 84 year old female who presents for a Subsequent Annual Wellness visit (Pt already had Initial Annual Wellness) and scheduled follow up of multiple significant but stable problems.     PMhx:   Back pain- takes aleve once every few days   Glaucoma- on eye drops  Hearing problems- her whole life . Now hearing aids         Non-smoker.  No history of hypertension, CVA nor MI.   No palpitations, dizziness or headaches     HTN  Started on lisinopril 2.5 mg and  tolerating well      Lives alone, has friends, visits animal shelters, cooks healthy  Drives, does stairs  Shops 3-4 times a week and has a luggage cart     3 cats  Mom lived to be 89     She has a POA    She will get shingles at pharmacy     Doesn't want mammograms    Sees Dr Brownlee chiropractor Yoselyn bray on [place]     History/Other:   Fall Risk Assessment:   She has been screened for Falls and is low risk.      Cognitive Assessment:   She had a completely normal cognitive assessment - see flowsheet entries       Functional Ability/Status:   Gloria Robins has some abnormal functions as listed below:  She has Hearing problems based on screening of functional status.She has Vision problems based on screening of functional status. She has Walking problems based on screening of functional status.       Depression Screening (PHQ):  PHQ-2 SCORE: 0  , done 2/20/2025             Advanced Directives:   She does have a Living Will but we do NOT have it on file in Epic.    She does have a POA but we do NOT have it on file in Epic.    Discussed Advance Care Planning with patient (and family/surrogate if present). Standard forms made available to patient in After Visit Summary.      Patient Active Problem List   Diagnosis    Venous insufficiency of right leg    Mixed hearing loss, bilateral    Osteoporosis, unspecified osteoporosis type, unspecified pathological fracture presence    Acne rosacea    Spinal stenosis of lumbar  region at multiple levels    Elevated cholesterol    Hypertension     Allergies:  She is allergic to wasp venom and aspirin.    Current Medications:  Outpatient Medications Marked as Taking for the 2/20/25 encounter (Office Visit) with Laura Jaquez MD   Medication Sig    lisinopril 2.5 MG Oral Tab Take 1 tablet (2.5 mg total) by mouth daily.       Medical History:  She  has a past medical history of Allergic rhinitis, Hypertension, Obesity, Osteoarthritis, and Recurrent UTI.  Surgical History:  She  has a past surgical history that includes other surgical history; Cataract extraction w/  intraocular lens implant; inner ear surgery proc unlisted; inner ear surgery proc unlisted; and cataract.   Family History:  Her family history includes Diabetes in her brother; Hypertension in her sister; ileus (age of onset: 89) in her mother; unknown (age of onset: 75) in her father.  Social History:  She  reports that she has never smoked. She has never used smokeless tobacco. She reports current alcohol use of about 7.0 standard drinks of alcohol per week. She reports that she does not use drugs.    Tobacco:  She has never smoked tobacco.    CAGE Alcohol Screen:   CAGE screening score of 0 on 2/20/2025, showing low risk of alcohol abuse.      Patient Care Team:  Shelli Betts DO (Physical Medicine)  Denny Dale MD (OPHTHALMOLOGY)  Lesley Ramirez NP as Nurse Practitioner (Internal Medicine)  Cindy Pinedo APRN (Nurse Practitioner)    Review of Systems         GENERAL: feels well , no fevers, no weight changes  SKIN: denies any unusual skin lesions  EYES: denies blurred vision or double vision  HEENT: denies nasal congestion or sore throat  LUNGS: denies shortness of breath with exertion  CARDIOVASCULAR: denies chest pain on exertion  GI: no abd pain, diarrhea or constipation   : denies dysuria  NEURO: denies headaches, numbness, tingling or weakness  ENDOCRINE: no hx of DM or thyroid  problems      Objective:   Physical Exam       /86   Pulse 78   Ht 5' 1\" (1.549 m)   Wt 155 lb (70.3 kg)   SpO2 97%   BMI 29.29 kg/m²  Estimated body mass index is 29.29 kg/m² as calculated from the following:    Height as of this encounter: 5' 1\" (1.549 m).    Weight as of this encounter: 155 lb (70.3 kg).      /86   Pulse 78   Ht 5' 1\" (1.549 m)   Wt 155 lb (70.3 kg)   SpO2 97%   BMI 29.29 kg/m²   Body mass index is 29.29 kg/m².   GENERAL: well developed, well nourished,in no apparent distress  SKIN: no rashes,no suspicious lesions  HEENT: atraumatic, normocephalic,throat are clear; dentition good  EYES:PERRLA, EOMI,conjunctiva are clear  NECK: supple,no adenopathy  BREAST: pt declined  LUNGS: clear to auscultation  CARDIO: RRR without murmur  GI: soft ,no masses, HSM or tenderness  EXTREMITIES: no edema    Medicare Hearing Assessment:   Hearing Screening    Screening Method: Questionnaire  I have a problem hearing over the telephone: Yes I have trouble following the conversations when two or more people are talking at the same time: Yes   I have trouble understanding things on the TV: Yes I have to strain to understand conversations: Yes   I have to worry about missing the telephone ring or doorbell: Yes I have trouble hearing conversations in a noisy background such as a crowded room or restaurant: Yes   I get confused about where sounds come from: Yes I misunderstand some words in a sentence and need to ask people to repeat themselves: Yes   I especially have trouble understanding the speech of women and children: Yes I have trouble understanding the speaker in a large room such as at a meeting or place of Bahai: Yes   Many people I talk to seem to mumble (or don't speak clearly): Yes People get annoyed because I misunderstand what they say: Yes   I misunderstand what others are saying and make inappropriate responses: Yes I avoid social activities because I cannot hear well and fear I  will reply improperly: Yes   Family members and friends have told me they think I may have hearing loss: Yes             Visual Acuity:   Right Eye Visual Acuity: Uncorrected Right Eye Chart Acuity: 20/25   Left Eye Visual Acuity: Uncorrected Left Eye Chart Acuity: 20/25   Both Eyes Visual Acuity: Uncorrected Both Eyes Chart Acuity: 20/25   Able To Tolerate Visual Acuity: Yes        Assessment & Plan:   Gloria Robins is a 84 year old female who presents for a Medicare Assessment.     1. Physical exam  Doing great  Drives active, friends, pets   recommend get shingles vaccine at the pharmacy  - CBC With Differential With Platelet; Future  - Comp Metabolic Panel (14); Future  - Lipid Panel; Future    2. Hypertension, unspecified type  Controlled CPM   - lisinopril 2.5 MG Oral Tab; Take 1 tablet (2.5 mg total) by mouth daily.  Dispense: 90 tablet; Refill: 3        1. Physical exam (Primary)  -     CBC With Differential With Platelet; Future; Expected date: 02/20/2025  -     Comp Metabolic Panel (14); Future; Expected date: 02/20/2025  -     Cancel: TSH W Reflex To Free T4; Future; Expected date: 02/20/2025  -     Lipid Panel; Future; Expected date: 02/20/2025  2. Hypertension, unspecified type  -     Lisinopril; Take 1 tablet (2.5 mg total) by mouth daily.  Dispense: 90 tablet; Refill: 3    The patient indicates understanding of these issues and agrees to the plan.  Reinforced healthy diet, lifestyle, and exercise.      No follow-ups on file.     Laura Jaquez MD, 2/19/2025     Supplementary Documentation:   General Health:  In the past six months, have you lost more than 10 pounds without trying?: 2 - No  Has your appetite been poor?: No  Type of Diet: Balanced;Low Salt  How does the patient maintain a good energy level?: Stretching;Other (garden flowers around the house. Visiting friends, taking care of my cats)  How would you describe your daily physical activity?: Moderate  How would you describe your  current health state?: Good  How do you maintain positive mental well-being?: Social Interaction;Visiting Friends (visiting animal shelters)  On a scale of 0 to 10, with 0 being no pain and 10 being severe pain, what is your pain level?: 6 - (Moderate)  In the past six months, have you experienced urine leakage?: 0-No  At any time do you feel concerned for the safety/well-being of yourself and/or your children, in your home or elsewhere?: No  Have you had any immunizations at another office such as Influenza, Hepatitis B, Tetanus, or Pneumococcal?: Yes    Health Maintenance   Topic Date Due    Annual Physical  Never done    Zoster Vaccines (1 of 2) Never done    COVID-19 Vaccine (3 - 2024-25 season) 09/01/2024    Influenza Vaccine (1) 10/01/2024    Annual Depression Screening  01/01/2025    Fall Risk Screening (Annual)  01/01/2025    Pneumococcal Vaccine: 50+ Years  Completed    Meningococcal B Vaccine  Aged Out

## 2025-02-20 ENCOUNTER — OFFICE VISIT (OUTPATIENT)
Dept: INTERNAL MEDICINE CLINIC | Facility: CLINIC | Age: 85
End: 2025-02-20
Payer: MEDICARE

## 2025-02-20 VITALS
HEART RATE: 78 BPM | SYSTOLIC BLOOD PRESSURE: 124 MMHG | WEIGHT: 155 LBS | HEIGHT: 61 IN | OXYGEN SATURATION: 97 % | DIASTOLIC BLOOD PRESSURE: 86 MMHG | BODY MASS INDEX: 29.27 KG/M2

## 2025-02-20 DIAGNOSIS — Z00.00 PHYSICAL EXAM: Primary | ICD-10-CM

## 2025-02-20 DIAGNOSIS — I10 HYPERTENSION, UNSPECIFIED TYPE: ICD-10-CM

## 2025-02-20 PROCEDURE — G0439 PPPS, SUBSEQ VISIT: HCPCS | Performed by: FAMILY MEDICINE

## 2025-02-20 RX ORDER — LISINOPRIL 2.5 MG/1
2.5 TABLET ORAL DAILY
Qty: 90 TABLET | Refills: 3 | Status: SHIPPED | OUTPATIENT
Start: 2025-02-20

## 2025-03-04 NOTE — PROGRESS NOTES
CC:  Follow - Up      Hx of CC:    Here for test results  She had concern about plaque seen on x-rays at her chiropractor's office    HTN- taking her medicine  + stress lately  No  CP or SOB          Allergies:  Allergies[1]   Current Meds:  Current Outpatient Medications   Medication Sig Dispense Refill    atorvastatin (LIPITOR) 10 MG Oral Tab Take 1 tablet (10 mg total) by mouth daily. 90 tablet 0    lisinopril 2.5 MG Oral Tab Take 1 tablet (2.5 mg total) by mouth daily. 90 tablet 3    latanoprost 0.005 % Ophthalmic Solution Place 1 drop into both eyes nightly.      REFRESH LACRI-LUBE Ophthalmic Ointment 3 (three) times daily. (Patient not taking: Reported on 2024)          History:  Past Medical History:    Allergic rhinitis    Hypertension    Obesity    Osteoarthritis    Recurrent UTI      Past Surgical History:   Procedure Laterality Date    Cataract      Cataract extraction w/  intraocular lens implant      Inner ear surgery proc unlisted      Inner ear surgery proc unlisted      Other surgical history      bilat ear       Family History   Problem Relation Age of Onset    Other (unknown) Father 75    Other (ileus) Mother 89    Hypertension Sister     Diabetes Brother       Family Status   Relation Status    Fa     Mo     Sis (Not Specified)    Bro (Not Specified)      Social History     Socioeconomic History    Marital status:    Tobacco Use    Smoking status: Never    Smokeless tobacco: Never   Vaping Use    Vaping status: Never Used   Substance and Sexual Activity    Alcohol use: Yes     Alcohol/week: 7.0 standard drinks of alcohol     Types: 7 Standard drinks or equivalent per week     Comment: 1.5 oz martini per day    Drug use: Never   Other Topics Concern    Caffeine Concern Yes    Pt has a pacemaker No    Pt has a defibrillator No    Reaction to local anesthetic No            ROS:  General:  No fever, no fatigue, no weight changes  HEENT:  Denies congestion or nasal  discharge  Cardio:  No chest pain   Pulmonary:  No cough, no SOB      Physical:    /70   Pulse 64   Ht 5' 1\" (1.549 m)   Wt 155 lb (70.3 kg)   SpO2 99%   BMI 29.29 kg/m²     General:  Alert, appropriate, no acute distress  HEENT:  Normocephalic, supple. Moist mucus membranes.  Cardio:  RRR, no murmurs, S1, S2  Pulmonary:  Clear bilaterally, good air entry  EXT: no edema  MS: normal movement   NEURO: no gross deficits     Reviewed carotid ultrasound and ultrasound for screening abdominal aorta aneurysm    Carotid ultrasound      Impression   CONCLUSION:     No flow-limiting stenosis in either carotid artery.     Mild atherosclerotic plaque involving the proximal left internal carotid artery.     Antegrade flow both vertebral arteries.            Assessment and Plan:    1. Carotid atherosclerosis, unspecified laterality  Discussed imaging findings with patient.  They are good given her age.  Mild plaque in the left carotid artery.  We discussed risks and benefits of statin.  She is interested in trying one.  Will try Lipitor 10 mg recheck lipids in 6 weeks.  If she has any side effects or muscle aches she should just stop it.  - atorvastatin (LIPITOR) 10 MG Oral Tab; Take 1 tablet (10 mg total) by mouth daily.  Dispense: 90 tablet; Refill: 0    2. Hypertension, unspecified type  Controlled CPM   - lisinopril 2.5 MG Oral Tab; Take 1 tablet (2.5 mg total) by mouth daily.  Dispense: 90 tablet; Refill: 3      There are no diagnoses linked to this encounter.  None  No orders of the defined types were placed in this encounter.             [1]   Allergies  Allergen Reactions    Wasp Venom RASH and SWELLING    Aspirin PAIN     stomach

## 2025-03-05 ENCOUNTER — OFFICE VISIT (OUTPATIENT)
Dept: INTERNAL MEDICINE CLINIC | Facility: CLINIC | Age: 85
End: 2025-03-05
Payer: MEDICARE

## 2025-03-05 VITALS
SYSTOLIC BLOOD PRESSURE: 140 MMHG | BODY MASS INDEX: 29.27 KG/M2 | WEIGHT: 155 LBS | HEART RATE: 64 BPM | HEIGHT: 61 IN | DIASTOLIC BLOOD PRESSURE: 70 MMHG | OXYGEN SATURATION: 99 %

## 2025-03-05 DIAGNOSIS — I10 HYPERTENSION, UNSPECIFIED TYPE: ICD-10-CM

## 2025-03-05 DIAGNOSIS — I65.29 CAROTID ATHEROSCLEROSIS, UNSPECIFIED LATERALITY: Primary | ICD-10-CM

## 2025-03-05 PROCEDURE — 99214 OFFICE O/P EST MOD 30 MIN: CPT | Performed by: FAMILY MEDICINE

## 2025-03-05 PROCEDURE — G2211 COMPLEX E/M VISIT ADD ON: HCPCS | Performed by: FAMILY MEDICINE

## 2025-03-05 RX ORDER — ATORVASTATIN CALCIUM 10 MG/1
10 TABLET, FILM COATED ORAL DAILY
Qty: 90 TABLET | Refills: 0 | Status: SHIPPED | OUTPATIENT
Start: 2025-03-05 | End: 2026-02-28

## 2025-03-05 RX ORDER — LISINOPRIL 2.5 MG/1
2.5 TABLET ORAL DAILY
Qty: 90 TABLET | Refills: 3 | Status: SHIPPED | OUTPATIENT
Start: 2025-03-05

## 2025-03-05 NOTE — PATIENT INSTRUCTIONS
If any problems taking the cholesterol medicine- stop it    Take it at night    Blood work 6 weeks, fasting

## 2025-04-22 ENCOUNTER — HOSPITAL ENCOUNTER (OUTPATIENT)
Age: 85
Discharge: HOME | End: 2025-04-22
Payer: MEDICARE

## 2025-04-22 VITALS
OXYGEN SATURATION: 98 % | DIASTOLIC BLOOD PRESSURE: 75 MMHG | HEART RATE: 99 BPM | SYSTOLIC BLOOD PRESSURE: 160 MMHG | RESPIRATION RATE: 18 BRPM | TEMPERATURE: 98 F

## 2025-04-22 DIAGNOSIS — H66.90 ACUTE OTITIS MEDIA, UNSPECIFIED OTITIS MEDIA TYPE: Primary | ICD-10-CM

## 2025-04-22 PROCEDURE — 99213 OFFICE O/P EST LOW 20 MIN: CPT

## 2025-04-22 RX ORDER — AMOXICILLIN 500 MG/1
500 TABLET, FILM COATED ORAL 3 TIMES DAILY
Qty: 30 TABLET | Refills: 0 | Status: SHIPPED | OUTPATIENT
Start: 2025-04-22 | End: 2025-05-02

## 2025-04-22 NOTE — ED PROVIDER NOTES
Patient Seen in: [facility]      History     Chief Complaint   Patient presents with    Ear Pain     Stated Complaint: Ear stabbing pain    Subjective:   HPI  84-year-old female presents with complaints of a sharp stabbing pain in her right ear that started 2 to 3 days ago.  She states she frequently puts cotton in her ears when she showers and was worried she may have some left in the ear.    History of Present Illness               Objective:     Past Medical History:    Allergic rhinitis    Hypertension    Obesity    Osteoarthritis    Recurrent UTI              Past Surgical History:   Procedure Laterality Date    Cataract      Cataract extraction w/  intraocular lens implant      Inner ear surgery proc unlisted      Inner ear surgery proc unlisted      Other surgical history      bilat ear                 Social History     Socioeconomic History    Marital status:    Tobacco Use    Smoking status: Never    Smokeless tobacco: Never   Vaping Use    Vaping status: Never Used   Substance and Sexual Activity    Alcohol use: Yes     Alcohol/week: 7.0 standard drinks of alcohol     Types: 7 Standard drinks or equivalent per week     Comment: 1.5 oz martini per day    Drug use: Never   Other Topics Concern    Caffeine Concern Yes    Pt has a pacemaker No    Pt has a defibrillator No    Reaction to local anesthetic No              Review of Systems    Positive for stated complaint: Ear stabbing pain  Other systems are as noted in HPI.  Constitutional and vital signs reviewed.      All other systems reviewed and negative except as noted above.                  Physical Exam     ED Triage Vitals [04/22/25 1315]   /75   Pulse 99   Resp 18   Temp 97.9 °F (36.6 °C)   Temp src    SpO2 98 %   O2 Device None (Room air)       Current Vitals:   Vital Signs  BP: 160/75  Pulse: 99  Resp: 18  Temp: 97.9 °F (36.6 °C)    Oxygen Therapy  SpO2: 98 %  O2 Device: None (Room air)        Physical Exam  Vitals  reviewed.   Constitutional:       General: She is not in acute distress.  HENT:      Left Ear: Tympanic membrane, ear canal and external ear normal.      Ears:      Comments: There is positive erythema of the right TM.  There is no drainage.  There is no foreign body seen.     Nose: Nose normal.      Mouth/Throat:      Mouth: Mucous membranes are moist.   Cardiovascular:      Rate and Rhythm: Normal rate and regular rhythm.   Pulmonary:      Effort: Pulmonary effort is normal.      Breath sounds: Normal breath sounds.   Musculoskeletal:         General: Normal range of motion.   Skin:     General: Skin is warm and dry.   Neurological:      General: No focal deficit present.      Mental Status: She is alert and oriented to person, place, and time.   Psychiatric:         Mood and Affect: Mood normal.         Behavior: Behavior normal.           Physical Exam                ED Course   Labs Reviewed - No data to display       Results                                 MDM              Medical Decision Making  84-year-old female presents with right ear pain.  Differential diagnosis includes otitis media, otitis externa, foreign body, impacted cerumen.  Patient reports a sensation of stabbing pain in the right ear.  She states she frequently puts cotton in her ear when she takes a shower and was worried there could be a piece in there.  On exam there is positive erythema of the right TM.  There is no drainage.  There is no visible foreign body.  Prescription for amoxicillin was sent to patient's pharmacy.  She was given printed instructions to help with her symptoms.    Risk  OTC drugs.  Prescription drug management.        Disposition and Plan     Clinical Impression:  1. Acute otitis media, unspecified otitis media type         Disposition:  Discharge  4/22/2025  1:24 pm    Follow-up:  Laura Jaquez MD  [address] [place] [id number]  [phone]      If symptoms worsen          Medications  Prescribed:  Discharge Medication List as of 4/22/2025  1:25 PM        START taking these medications    Details   amoxicillin 500 MG Oral Tab Take 1 tablet (500 mg total) by mouth 3 (three) times daily for 10 days., Normal, Disp-30 tablet, R-0             Supplementary Documentation:

## 2025-04-22 NOTE — ED INITIAL ASSESSMENT (HPI)
Patient arrived ambulatory to room c/o right ear pain x3 days ago. No drainage. No hearing loss. No nasal congestion/fevers. Patient states she normally puts cotton in her ear when she takes a shower and possibly left some in her ear.

## 2025-05-29 ENCOUNTER — LAB ENCOUNTER (OUTPATIENT)
Dept: LAB | Age: 85
End: 2025-05-29
Attending: FAMILY MEDICINE
Payer: MEDICARE

## 2025-05-29 DIAGNOSIS — Z00.00 PHYSICAL EXAM: ICD-10-CM

## 2025-05-29 LAB
ALBUMIN SERPL-MCNC: 4.4 G/DL (ref 3.2–4.8)
ALBUMIN/GLOB SERPL: 1.3 {RATIO} (ref 1–2)
ALP LIVER SERPL-CCNC: 156 U/L (ref 55–142)
ALT SERPL-CCNC: 22 U/L (ref 10–49)
ANION GAP SERPL CALC-SCNC: 4 MMOL/L (ref 0–18)
AST SERPL-CCNC: 17 U/L (ref ?–34)
BASOPHILS # BLD AUTO: 0.03 X10(3) UL (ref 0–0.2)
BASOPHILS NFR BLD AUTO: 0.6 %
BILIRUB SERPL-MCNC: 0.6 MG/DL (ref 0.2–1.1)
BUN BLD-MCNC: 14 MG/DL (ref 9–23)
BUN/CREAT SERPL: 19.7 (ref 10–20)
CALCIUM BLD-MCNC: 10.8 MG/DL (ref 8.7–10.4)
CHLORIDE SERPL-SCNC: 106 MMOL/L (ref 98–112)
CHOLEST SERPL-MCNC: 222 MG/DL (ref ?–200)
CO2 SERPL-SCNC: 30 MMOL/L (ref 21–32)
CREAT BLD-MCNC: 0.71 MG/DL (ref 0.55–1.02)
DEPRECATED RDW RBC AUTO: 48.6 FL (ref 35.1–46.3)
EGFRCR SERPLBLD CKD-EPI 2021: 83 ML/MIN/1.73M2 (ref 60–?)
EOSINOPHIL # BLD AUTO: 0.08 X10(3) UL (ref 0–0.7)
EOSINOPHIL NFR BLD AUTO: 1.6 %
ERYTHROCYTE [DISTWIDTH] IN BLOOD BY AUTOMATED COUNT: 13.1 % (ref 11–15)
FASTING PATIENT LIPID ANSWER: YES
FASTING STATUS PATIENT QL REPORTED: YES
GLOBULIN PLAS-MCNC: 3.4 G/DL (ref 2–3.5)
GLUCOSE BLD-MCNC: 108 MG/DL (ref 70–99)
HCT VFR BLD AUTO: 42.4 % (ref 35–48)
HDLC SERPL-MCNC: 91 MG/DL (ref 40–59)
HGB BLD-MCNC: 14.1 G/DL (ref 12–16)
IMM GRANULOCYTES # BLD AUTO: 0.01 X10(3) UL (ref 0–1)
IMM GRANULOCYTES NFR BLD: 0.2 %
LDLC SERPL CALC-MCNC: 116 MG/DL (ref ?–100)
LYMPHOCYTES # BLD AUTO: 1.57 X10(3) UL (ref 1–4)
LYMPHOCYTES NFR BLD AUTO: 30.8 %
MCH RBC QN AUTO: 33.4 PG (ref 26–34)
MCHC RBC AUTO-ENTMCNC: 33.3 G/DL (ref 31–37)
MCV RBC AUTO: 100.5 FL (ref 80–100)
MONOCYTES # BLD AUTO: 0.39 X10(3) UL (ref 0.1–1)
MONOCYTES NFR BLD AUTO: 7.7 %
NEUTROPHILS # BLD AUTO: 3.01 X10 (3) UL (ref 1.5–7.7)
NEUTROPHILS # BLD AUTO: 3.01 X10(3) UL (ref 1.5–7.7)
NEUTROPHILS NFR BLD AUTO: 59.1 %
NONHDLC SERPL-MCNC: 131 MG/DL (ref ?–130)
OSMOLALITY SERPL CALC.SUM OF ELEC: 291 MOSM/KG (ref 275–295)
PLATELET # BLD AUTO: 241 10(3)UL (ref 150–450)
POTASSIUM SERPL-SCNC: 4.6 MMOL/L (ref 3.5–5.1)
PROT SERPL-MCNC: 7.8 G/DL (ref 5.7–8.2)
RBC # BLD AUTO: 4.22 X10(6)UL (ref 3.8–5.3)
SODIUM SERPL-SCNC: 140 MMOL/L (ref 136–145)
TRIGL SERPL-MCNC: 88 MG/DL (ref 30–149)
VLDLC SERPL CALC-MCNC: 15 MG/DL (ref 0–30)
WBC # BLD AUTO: 5.1 X10(3) UL (ref 4–11)

## 2025-05-29 PROCEDURE — 85025 COMPLETE CBC W/AUTO DIFF WBC: CPT

## 2025-05-29 PROCEDURE — 80061 LIPID PANEL: CPT

## 2025-05-29 PROCEDURE — 36415 COLL VENOUS BLD VENIPUNCTURE: CPT

## 2025-05-29 PROCEDURE — 80053 COMPREHEN METABOLIC PANEL: CPT

## 2025-06-10 NOTE — PROGRESS NOTES
CC:  Leg Pain (Pain on both legs ) and Low Back Pain      Hx of CC:    Here for Back and leg pain  Mostly lower legs to her knees , in front but can also be hips  Back hurts when walking or standing  Hx lumbar stenosis and herniated disc   No pain in bed or sitting  No numbness or tingling  No swelling   Just sore  Takes aleve at times  No abd pain   No urinary problems   Pain also worse first in the morning    Tylenol didn't help  Pain gets to 8-9/10    Not on lipitor- had rash on lips so only took two days and this was months ago      Meds:   Lisinopril 2.5    Allergies:  Allergies[1]   Current Meds:  Current Medications[2]     History:  Past Medical History[3]   Past Surgical History[4]   Family History[5]   Family Status   Relation Status    Fa     Mo     Sis (Not Specified)    Bro (Not Specified)      Short Social Hx on File[6]         ROS:  General:  No fever, no fatigue, no weight changes  Cardio:  No chest pain   Pulmonary:  No cough, no SOB  GI:  No N/V/D  Dermatologic:  No rashes   Per HPI    Physical:    /64   Pulse 66   Ht 5' 1\" (1.549 m)   Wt 156 lb (70.8 kg)   SpO2 99%   BMI 29.48 kg/m²     General:  Alert, appropriate, no acute distress  HEENT:  Normocephalic, supple. Moist mucus membranes.  Cardio:  RRR, no murmurs, S1, S2  Pulmonary:  Clear bilaterally, good air entry  Dermatologic:  No rashes or lesions  MS: Lumbar spine with tenderness palpation.  Negative straight leg raise.  Normal gait.  No hip tenderness.  No calf tenderness or calf swelling  EXT: no edema, 1+ dorsal pedal pulses.  Positive varicose veins.  Feet are warm and well-perfused.  MS: normal movement   NEURO: no gross deficits   Assessment and Plan:    1. Low back pain, unspecified back pain laterality, unspecified chronicity, unspecified whether sciatica present  Will start with x-ray, heat and stretch.  May need MRI  Refer to physiatry.    History of spinal stenosis.    Will check BROOKE to look for  peripheral arterial disease.    Follow-up with me in 1 month.  Tylenol 3 given for more severe pain she can take this for bed.  Discussed that this can make her tired, drowsy, constipated and to take with caution.  Patient voiced understanding of plan.  - XR LUMBAR SPINE (MIN 4 VIEWS) (CPT=72110); Future  - Physiatry Referral - In Network  - acetaminophen-codeine 300-30 MG Oral Tab; Take 1 tablet by mouth every 6 (six) hours as needed for Pain.  Dispense: 20 tablet; Refill: 0    2. Pain in both lower extremities      - XR LUMBAR SPINE (MIN 4 VIEWS) (CPT=72110); Future  - US ANKLE BRACHIAL INDEX (CPT=93922); Future  - acetaminophen-codeine 300-30 MG Oral Tab; Take 1 tablet by mouth every 6 (six) hours as needed for Pain.  Dispense: 20 tablet; Refill: 0    3. Decreased pedal pulses    - US ANKLE BRACHIAL INDEX (CPT=93922); Future      Return in 1-2 months, or sooner if symptoms worsen or fail to improve.  Patient indicates understanding of the above recommendations and agrees to the above plan.  Reassurance and education provided.  Notified to call with any questions, complications, allergies, or worsening or changing symptoms as well as any side effects or complications from the treatments.  Red flags/ ER precautions discussed.    Laura Jaquez MD  Diplomat of the American Board of Family Medicine  Diplomat of the American Board of Obesity Medicine      There are no diagnoses linked to this encounter.  PHYSIATRY - INTERNAL  XR LUMBAR SPINE (MIN 4 VIEWS) (CPT=72110)  US ANKLE BRACHIAL INDEX (CPT=93922)  No orders of the defined types were placed in this encounter.             [1]   Allergies  Allergen Reactions    Wasp Venom RASH and SWELLING    Aspirin PAIN     stomach   [2]   Current Outpatient Medications   Medication Sig Dispense Refill    acetaminophen-codeine 300-30 MG Oral Tab Take 1 tablet by mouth every 6 (six) hours as needed for Pain. 20 tablet 0    atorvastatin (LIPITOR) 10 MG Oral Tab Take 1 tablet (10  mg total) by mouth daily. 90 tablet 0    lisinopril 2.5 MG Oral Tab Take 1 tablet (2.5 mg total) by mouth daily. 90 tablet 3    latanoprost 0.005 % Ophthalmic Solution Place 1 drop into both eyes nightly.      REFRESH LACRI-LUBE Ophthalmic Ointment 3 (three) times daily. (Patient not taking: Reported on 6/12/2025)     [3]   Past Medical History:   Allergic rhinitis    Hypertension    Obesity    Osteoarthritis    Recurrent UTI   [4]   Past Surgical History:  Procedure Laterality Date    Cataract      Cataract extraction w/  intraocular lens implant      Inner ear surgery proc unlisted      Inner ear surgery proc unlisted      Other surgical history      bilat ear    [5]   Family History  Problem Relation Age of Onset    Other (unknown) Father 75    Other (ileus) Mother 89    Hypertension Sister     Diabetes Brother    [6]   Social History  Socioeconomic History    Marital status:    Tobacco Use    Smoking status: Never    Smokeless tobacco: Never   Vaping Use    Vaping status: Never Used   Substance and Sexual Activity    Alcohol use: Yes     Alcohol/week: 7.0 standard drinks of alcohol     Types: 7 Standard drinks or equivalent per week     Comment: 1.5 oz martini per day    Drug use: Never   Other Topics Concern    Caffeine Concern Yes    Pt has a pacemaker No    Pt has a defibrillator No    Reaction to local anesthetic No

## 2025-06-12 ENCOUNTER — OFFICE VISIT (OUTPATIENT)
Dept: INTERNAL MEDICINE CLINIC | Facility: CLINIC | Age: 85
End: 2025-06-12
Payer: MEDICARE

## 2025-06-12 VITALS
HEIGHT: 61 IN | BODY MASS INDEX: 29.45 KG/M2 | SYSTOLIC BLOOD PRESSURE: 130 MMHG | DIASTOLIC BLOOD PRESSURE: 64 MMHG | OXYGEN SATURATION: 99 % | HEART RATE: 66 BPM | WEIGHT: 156 LBS

## 2025-06-12 DIAGNOSIS — R09.89 DECREASED PEDAL PULSES: ICD-10-CM

## 2025-06-12 DIAGNOSIS — M54.50 LOW BACK PAIN, UNSPECIFIED BACK PAIN LATERALITY, UNSPECIFIED CHRONICITY, UNSPECIFIED WHETHER SCIATICA PRESENT: Primary | ICD-10-CM

## 2025-06-12 DIAGNOSIS — M79.605 PAIN IN BOTH LOWER EXTREMITIES: ICD-10-CM

## 2025-06-12 DIAGNOSIS — M79.604 PAIN IN BOTH LOWER EXTREMITIES: ICD-10-CM

## 2025-06-12 PROCEDURE — G2211 COMPLEX E/M VISIT ADD ON: HCPCS | Performed by: FAMILY MEDICINE

## 2025-06-12 PROCEDURE — 99214 OFFICE O/P EST MOD 30 MIN: CPT | Performed by: FAMILY MEDICINE

## 2025-06-12 RX ORDER — ACETAMINOPHEN AND CODEINE PHOSPHATE 300; 30 MG/1; MG/1
1 TABLET ORAL EVERY 6 HOURS PRN
Qty: 20 TABLET | Refills: 0 | Status: SHIPPED | OUTPATIENT
Start: 2025-06-12

## 2025-06-13 ENCOUNTER — HOSPITAL ENCOUNTER (OUTPATIENT)
Dept: GENERAL RADIOLOGY | Age: 85
Discharge: HOME | End: 2025-06-13
Attending: FAMILY MEDICINE
Payer: MEDICARE

## 2025-06-13 DIAGNOSIS — M54.50 LOW BACK PAIN, UNSPECIFIED BACK PAIN LATERALITY, UNSPECIFIED CHRONICITY, UNSPECIFIED WHETHER SCIATICA PRESENT: ICD-10-CM

## 2025-06-13 DIAGNOSIS — M79.604 PAIN IN BOTH LOWER EXTREMITIES: ICD-10-CM

## 2025-06-13 DIAGNOSIS — M79.605 PAIN IN BOTH LOWER EXTREMITIES: ICD-10-CM

## 2025-06-13 PROCEDURE — 72110 X-RAY EXAM L-2 SPINE 4/>VWS: CPT | Performed by: FAMILY MEDICINE

## 2025-06-16 ENCOUNTER — HOSPITAL ENCOUNTER (OUTPATIENT)
Dept: ULTRASOUND IMAGING | Facility: HOSPITAL | Age: 85
Discharge: HOME | End: 2025-06-16
Attending: FAMILY MEDICINE
Payer: MEDICARE

## 2025-06-16 DIAGNOSIS — R09.89 DECREASED PEDAL PULSES: ICD-10-CM

## 2025-06-16 DIAGNOSIS — M79.605 PAIN IN BOTH LOWER EXTREMITIES: ICD-10-CM

## 2025-06-16 DIAGNOSIS — M79.604 PAIN IN BOTH LOWER EXTREMITIES: ICD-10-CM

## 2025-06-16 PROCEDURE — 93922 UPR/L XTREMITY ART 2 LEVELS: CPT | Performed by: FAMILY MEDICINE

## 2025-06-24 ENCOUNTER — APPOINTMENT (OUTPATIENT)
Dept: GENERAL RADIOLOGY | Age: 85
End: 2025-06-24
Attending: PHYSICIAN ASSISTANT
Payer: MEDICARE

## 2025-06-24 ENCOUNTER — HOSPITAL ENCOUNTER (OUTPATIENT)
Age: 85
Discharge: HOME | End: 2025-06-24
Payer: MEDICARE

## 2025-06-24 VITALS
OXYGEN SATURATION: 96 % | HEART RATE: 77 BPM | TEMPERATURE: 97 F | DIASTOLIC BLOOD PRESSURE: 60 MMHG | SYSTOLIC BLOOD PRESSURE: 164 MMHG | RESPIRATION RATE: 12 BRPM

## 2025-06-24 DIAGNOSIS — S82.001A CLOSED NONDISPLACED FRACTURE OF RIGHT PATELLA, UNSPECIFIED FRACTURE MORPHOLOGY, INITIAL ENCOUNTER: ICD-10-CM

## 2025-06-24 DIAGNOSIS — S80.01XA CONTUSION OF RIGHT KNEE, INITIAL ENCOUNTER: Primary | ICD-10-CM

## 2025-06-24 PROCEDURE — 99215 OFFICE O/P EST HI 40 MIN: CPT

## 2025-06-24 PROCEDURE — 73562 X-RAY EXAM OF KNEE 3: CPT | Performed by: PHYSICIAN ASSISTANT

## 2025-06-24 PROCEDURE — 90471 IMMUNIZATION ADMIN: CPT

## 2025-06-24 PROCEDURE — 99214 OFFICE O/P EST MOD 30 MIN: CPT

## 2025-06-24 RX ORDER — IBUPROFEN 600 MG/1
600 TABLET, FILM COATED ORAL ONCE
Status: COMPLETED | OUTPATIENT
Start: 2025-06-24 | End: 2025-06-24

## 2025-06-24 NOTE — ED PROVIDER NOTES
Chief Complaint   Patient presents with    Fall       HPI:     Gloria Robins is a 84 year old female who presents for evaluation of mechanical fall just prior to arrival, patient states tripped over uneven pavement falling forward scraping the right forearm and the right knee on the ground.  Notes pain localized along the anterior knee able to ambulate without prolonged time down after injury, pain is localized 4-10, denies previous orthopedic issues to lower extremity.  Tetanus unknown, requesting update.  Denies associated head injury LOC headache dizziness neck pain chest pain shortness of breath abdominal pain back pain upper extremity pain or weakness.  Patient taking Tylenol codeine daily over the last week after being evaluated for lower back issues and degenerative changes through primary including lumbar x-ray films last week.  Patient denies contraindication to ibuprofen requesting medication pending radiographs.      PFSH    PFS asessment screens reviewed and agree.  Nurses notes reviewed I agree with documentation.    Family History[1]  Family history reviewed with patient/caregiver and is not pertinent to presenting problem.  Social History     Socioeconomic History    Marital status:      Spouse name: Not on file    Number of children: Not on file    Years of education: Not on file    Highest education level: Not on file   Occupational History    Not on file   Tobacco Use    Smoking status: Never    Smokeless tobacco: Never   Vaping Use    Vaping status: Never Used   Substance and Sexual Activity    Alcohol use: Yes     Alcohol/week: 7.0 standard drinks of alcohol     Types: 7 Standard drinks or equivalent per week     Comment: 1.5 oz martini per day    Drug use: Never    Sexual activity: Not on file   Other Topics Concern     Service Not Asked    Blood Transfusions Not Asked    Caffeine Concern Yes    Occupational Exposure Not Asked    Hobby Hazards Not Asked    Sleep Concern  Not Asked    Stress Concern Not Asked    Weight Concern Not Asked    Special Diet Not Asked    Back Care Not Asked    Exercise Not Asked    Bike Helmet Not Asked    Seat Belt Not Asked    Self-Exams Not Asked    Grew up on a farm Not Asked    History of tanning Not Asked    Outdoor occupation Not Asked    Pt has a pacemaker No    Pt has a defibrillator No    Breast feeding Not Asked    Reaction to local anesthetic No   Social History Narrative    Not on file     Social Drivers of Health     Food Insecurity: Not on file   Transportation Needs: Not on file   Housing Stability: Not on file         ROS:   Positive for stated complaint: Forearm abrasion knee injury.  All other systems reviewed and negative except as noted above.  Constitutional and Vital Signs Reviewed.      Physical Exam:     Findings:    BP (!) 164/60   Pulse 77   Temp 97.3 °F (36.3 °C) (Oral)   Resp 12   SpO2 96%   GENERAL: well developed, well nourished, well hydrated, no distress  SKIN: good skin turgor, no obvious rashes  NECK: No midline cervical tenderness.  Supple, no adenopathy  CARDIO: RRR without murmur  EXTREMITIES: Mild localized edema parapatellar right lower extremity with superficial abrasion along the inferior aspect of the patella without direct tenderness crepitus or laxity.  Normal anterior drawer test, DP pulse and sensation intact.  No hip tenderness or pelvic instability.  No ankle or foot tenderness.  Superficial abrasions along the posterior right forearm extending to the posterior elbow region.  No crepitus skin tear normal range of motion of forearm wrist hand upper arm and shoulder.  PARK without difficulty  GI: soft, non-tender, normal bowel sounds  HEAD: normocephalic, atraumatic  EYES: sclera non icteric bilateral, conjunctiva clear  NOSE: nasal turbinates: pink, normal mucosa  LUNGS: clear to auscultation bilaterally; no rales, rhonchi, or wheezes  NEURO: No focal deficits  PSYCH: Alert and oriented x3.  Answering  questions appropriately.  Mood appropriate.    MDM/Assessment/Plan:   Orders for this encounter:    Orders Placed This Encounter    XR KNEE (3 VIEWS), RIGHT (CPT=73562)     What is the Relevant Clinical Indication / Reason for Exam?:   Fall, arm/knee injury     Release to patient:   Immediate    Walker    Ace Wrap    Knee immobilizer    ibuprofen (Motrin) tab 600 mg    Tetanus-Diphth-Acell Pertussis (Tdap) (Boostrix) injection 0.5 mL       Labs performed this visit:  No results found for this or any previous visit (from the past 10 hours).    MDM:  Preliminary interpretation in the x-ray department with film not crossing over through the EMR from my login shows potential nondisplaced fracture along the patella.  Confirmed by radiologist Dr. Ballard verbally secondary suprapatellar joint effusion noted..   Patient understands it is lucid and decisional, patient states does have a POA medical which does not apply Ace wrap and knee immobilizer applied, removed by patient request to drive home understanding potential risks and advice against based on cognitive evaluation today.  Driving vehicle until medical follow-up.  Patient agrees to continue baseline Tylenol codeine as prescribed through PCP in the morning with secondary ibuprofen unless contraindicated, 1 dose given during encounter.  Dressing along the right proximal forearm placed and cleansed.    Diagnosis:    ICD-10-CM    1. Contusion of right knee, initial encounter  S80.01XA       2. Closed nondisplaced fracture of right patella, unspecified fracture morphology, initial encounter  S82.001A           All results reviewed and discussed with patient.  See AVS for detailed discharge instructions for your condition today.    Follow Up with:  Laura Jaquez MD  [facility] [address] [phone]    Call today  READDRESS WITH PRIMARY CARE.    Hany Matos MD  [address]  [address]  [place] [address] [phone]    Call  today  ORTHOPEDIC REFERRAL.           [1]   Family History  Problem Relation Age of Onset    Other (unknown) Father 75    Other (ileus) Mother 89    Hypertension Sister     Diabetes Brother

## 2025-06-24 NOTE — ED INITIAL ASSESSMENT (HPI)
To room 8 via wheelchair.  Patient states she tripped on uneven sidewalk this afternoon and fell forward.  + abrasions noted to right forearm and right knee.  Reports right knee pain with ambulation.  Denies loc.  Denies striking her head.

## 2025-06-24 NOTE — DISCHARGE INSTRUCTIONS
ADVISE USING WALKER AT ALL TIMES.       PLEASE ANSWER PHONE CALL WITH XRAY RESULT WHICH YOU ARE LEAVING PRIOR TO REPORT AT YOUR REQUEST.     WEAR ACE WRAP AND KNEE IMMOBILIZER DAILY WITH WALKING UNTIL OUTPATIENT FOLLOW UP AND CLEARANCE BY PRIMARY CARE AND ORTHOPEDIC REFERRAL.     CONTINUE PAIN  MEDICATION prescribed WITH PRECAUTION. IBUPROFEN SECOND FOR SUPPORT AFTER CONFIRMING USE WITH PRIMARY CARE BY RECOMMENDATION.

## 2025-06-25 ENCOUNTER — TELEPHONE (OUTPATIENT)
Dept: INTERNAL MEDICINE CLINIC | Facility: CLINIC | Age: 85
End: 2025-06-25

## 2025-06-25 NOTE — TELEPHONE ENCOUNTER
Attempted to contact patient. Unable to reach. Message left on voicemail to call and speak with the nurse.

## 2025-06-25 NOTE — TELEPHONE ENCOUNTER
Patient called the office.  She said she fell and went to the [facility] yesterday.    They told her to reach out to her PCP to double check that she can take Motrin along with the prescription they gave her.

## 2025-07-22 NOTE — PROGRESS NOTES
CC:  Follow - Up      Hx of CC:          Fell in  - went to ER. Hurt her knee and scraped her arm when tripped and fell outside of [employer] store.  No head injury.  Went to immediate care   Has been wearing a knee  brace  Doing better   Takes aleve every other day  Tylenol didn't help   No pain at rest but still has pain with walking and some movements           Meds:   Lisinopril 2.5    Lives alone    Allergies:  Allergies[1]   Current Meds:  Current Medications[2]     History:  Past Medical History[3]   Past Surgical History[4]   Family History[5]   Family Status   Relation Status    Fa     Mo     Sis (Not Specified)    Bro (Not Specified)      Short Social Hx on File[6]         ROS:  General:  No fever, no fatigue, no weight changes  Cardio:  No chest pain   Pulmonary:  No cough, no SOB   Per HPI    Physical:    /80   Pulse 78   Ht 5' 1\" (1.549 m)   Wt 150 lb (68 kg)   SpO2 97%   BMI 28.34 kg/m²    Repeat BP same    General:  Alert, appropriate, no acute distress  HEENT:  Normocephalic, supple. Moist mucus membranes.  Cardio:  RRR, no murmurs, S1, S2  Pulmonary:  Clear bilaterally, good air entry  Dermatologic:  No rashes or lesions  MS: R knee brace removed.  Knee and patella has no swelling or tenderness no bruising.  Full range of motion  of knee but some pain with flexion and extension.  Now using walker for support  No calf tenderness or calf swelling  EXT: no edema, .  Feet are warm and well-perfused.  NEURO: no gross deficits       25   Narrative   PROCEDURE: XR KNEE (3 VIEWS), RIGHT (CPT=73562)    INDICATIONS: Fall, arm/knee injury    COMPARISON: No comparisons.    FINDINGS:      Acute obliquely oriented nondisplaced central patellar fracture.    Demineralization.    Mild to moderate tricompartmental osteoarthritis. There is also extensive medial and lateral compartment chondrocalcinosis.    Moderate suprapatellar joint effusion.    No soft tissue swelling.       Impression   CONCLUSION:    Acute obliquely oriented nondisplaced central patellar fracture. Associated suprapatellar joint effusion/hemarthrosis. Findings were discussed with GARY Christensen at [place] immediate care at the time of the patient.    Tricompartmental osteoarthritis of the right knee. 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 Closed nondisplaced fracture of right patella with routine healing, unspecified fracture morphology, subsequent encounter  Reviewed test results with patient  Fall precautions discussed-use walker  Continue to use knee brace  Would like her to see Ortho this. She still has pain with movement  Suspect osteoarthritis is also contributing recommend ice.    Can take ibuprofen-her kidney functioning is normal will recommend use of a limited basis.  Take with food and stop if having side effects or stomach  - Ortho Referral - In Network    2. Acute pain of right knee        3. Hypertension, unspecified type    Increase lisinopril to 5 mg ( from 2.5)   - lisinopril 5 MG Oral Tab; Take 1 tablet (5 mg total) by mouth daily.  Dispense: 90 tablet; Refill: 3        Return in 3  months, or sooner if symptoms worsen or fail to improve.  Patient indicates understanding of the above recommendations and agrees to the above plan.  Reassurance and education provided.  Notified to call with any questions, complications, allergies, or worsening or changing symptoms as well as any side effects or complications from the treatments.  Red flags/ ER precautions discussed.    Laura Jaquez MD  Diplomat of the American Board of Family Medicine  Diplomat of the American Board of Obesity Medicine      There are no diagnoses linked to this encounter.  ORTHOPEDIC - INTERNAL  No orders of the defined types were placed in this encounter.      g         [1]   Allergies  Allergen Reactions    Wasp Venom RASH and SWELLING    Aspirin PAIN      stomach   [2]   Current Outpatient Medications   Medication Sig Dispense Refill    lisinopril 5 MG Oral Tab Take 1 tablet (5 mg total) by mouth daily. 90 tablet 3    atorvastatin (LIPITOR) 10 MG Oral Tab Take 1 tablet (10 mg total) by mouth daily. 90 tablet 0    latanoprost 0.005 % Ophthalmic Solution Place 1 drop into both eyes nightly.      acetaminophen-codeine 300-30 MG Oral Tab Take 1 tablet by mouth every 6 (six) hours as needed for Pain. (Patient not taking: Reported on 7/24/2025) 20 tablet 0    REFRESH LACRI-LUBE Ophthalmic Ointment 3 (three) times daily. (Patient not taking: Reported on 7/24/2025)     [3]   Past Medical History:   Allergic rhinitis    Hypertension    Obesity    Osteoarthritis    Recurrent UTI   [4]   Past Surgical History:  Procedure Laterality Date    Cataract      Cataract extraction w/  intraocular lens implant      Inner ear surgery proc unlisted      Inner ear surgery proc unlisted      Other surgical history      bilat ear    [5]   Family History  Problem Relation Age of Onset    Other (unknown) Father 75    Other (ileus) Mother 89    Hypertension Sister     Diabetes Brother    [6]   Social History  Socioeconomic History    Marital status:    Tobacco Use    Smoking status: Never    Smokeless tobacco: Never   Vaping Use    Vaping status: Never Used   Substance and Sexual Activity    Alcohol use: Yes     Alcohol/week: 7.0 standard drinks of alcohol     Types: 7 Standard drinks or equivalent per week     Comment: 1.5 oz martini per day    Drug use: Never   Other Topics Concern    Caffeine Concern Yes    Pt has a pacemaker No    Pt has a defibrillator No    Reaction to local anesthetic No

## 2025-07-24 ENCOUNTER — OFFICE VISIT (OUTPATIENT)
Dept: INTERNAL MEDICINE CLINIC | Facility: CLINIC | Age: 85
End: 2025-07-24
Payer: MEDICARE

## 2025-07-24 VITALS
HEIGHT: 61 IN | DIASTOLIC BLOOD PRESSURE: 80 MMHG | SYSTOLIC BLOOD PRESSURE: 160 MMHG | OXYGEN SATURATION: 97 % | BODY MASS INDEX: 28.32 KG/M2 | WEIGHT: 150 LBS | HEART RATE: 78 BPM

## 2025-07-24 DIAGNOSIS — I10 HYPERTENSION, UNSPECIFIED TYPE: ICD-10-CM

## 2025-07-24 DIAGNOSIS — M25.561 ACUTE PAIN OF RIGHT KNEE: ICD-10-CM

## 2025-07-24 DIAGNOSIS — S82.001D CLOSED NONDISPLACED FRACTURE OF RIGHT PATELLA WITH ROUTINE HEALING, UNSPECIFIED FRACTURE MORPHOLOGY, SUBSEQUENT ENCOUNTER: Primary | ICD-10-CM

## 2025-07-24 PROCEDURE — 99214 OFFICE O/P EST MOD 30 MIN: CPT | Performed by: FAMILY MEDICINE

## 2025-07-24 PROCEDURE — G2211 COMPLEX E/M VISIT ADD ON: HCPCS | Performed by: FAMILY MEDICINE

## 2025-07-24 RX ORDER — LISINOPRIL 5 MG/1
5 TABLET ORAL DAILY
Qty: 90 TABLET | Refills: 3 | Status: SHIPPED | OUTPATIENT
Start: 2025-07-24

## 2025-07-29 ENCOUNTER — TELEPHONE (OUTPATIENT)
Dept: ORTHOPEDICS CLINIC | Facility: CLINIC | Age: 85
End: 2025-07-29

## 2025-07-29 DIAGNOSIS — R52 PAIN: Primary | ICD-10-CM

## 2025-08-01 ENCOUNTER — HOSPITAL ENCOUNTER (OUTPATIENT)
Dept: GENERAL RADIOLOGY | Age: 85
Discharge: HOME | End: 2025-08-01
Attending: ORTHOPAEDIC SURGERY

## 2025-08-01 ENCOUNTER — OFFICE VISIT (OUTPATIENT)
Dept: ORTHOPEDICS CLINIC | Facility: CLINIC | Age: 85
End: 2025-08-01

## 2025-08-01 VITALS — BODY MASS INDEX: 28.51 KG/M2 | HEIGHT: 61 IN | WEIGHT: 151 LBS

## 2025-08-01 DIAGNOSIS — R52 PAIN: ICD-10-CM

## 2025-08-01 DIAGNOSIS — S82.034A CLOSED NONDISPLACED TRANSVERSE FRACTURE OF RIGHT PATELLA, INITIAL ENCOUNTER: Primary | ICD-10-CM

## 2025-08-01 PROCEDURE — 27520 TREAT KNEECAP FRACTURE: CPT | Performed by: ORTHOPAEDIC SURGERY

## 2025-08-01 PROCEDURE — 73564 X-RAY EXAM KNEE 4 OR MORE: CPT | Performed by: ORTHOPAEDIC SURGERY

## 2025-08-01 PROCEDURE — 99204 OFFICE O/P NEW MOD 45 MIN: CPT | Performed by: ORTHOPAEDIC SURGERY

## 2025-08-01 RX ORDER — BIMATOPROST 0.1 MG/ML
SOLUTION/ DROPS OPHTHALMIC
COMMUNITY
Start: 2025-03-25

## 2025-08-01 RX ORDER — TIMOLOL MALEATE 5 MG/ML
1 SOLUTION/ DROPS OPHTHALMIC 2 TIMES DAILY
COMMUNITY
Start: 2025-06-12

## 2025-08-01 RX ORDER — COVID-19 ANTIGEN TEST
220 KIT MISCELLANEOUS
COMMUNITY

## 2025-08-27 ENCOUNTER — OFFICE VISIT (OUTPATIENT)
Dept: PHYSICAL THERAPY | Age: 85
End: 2025-08-27
Attending: ORTHOPAEDIC SURGERY

## 2025-08-27 DIAGNOSIS — S82.034A CLOSED NONDISPLACED TRANSVERSE FRACTURE OF RIGHT PATELLA, INITIAL ENCOUNTER: Primary | ICD-10-CM

## 2025-08-27 PROCEDURE — 97110 THERAPEUTIC EXERCISES: CPT

## 2025-08-27 PROCEDURE — 97162 PT EVAL MOD COMPLEX 30 MIN: CPT

## 2025-08-27 PROCEDURE — 97530 THERAPEUTIC ACTIVITIES: CPT

## 2025-08-29 ENCOUNTER — OFFICE VISIT (OUTPATIENT)
Dept: PHYSICAL THERAPY | Age: 85
End: 2025-08-29
Attending: ORTHOPAEDIC SURGERY

## 2025-08-29 PROCEDURE — 97110 THERAPEUTIC EXERCISES: CPT
